# Patient Record
Sex: FEMALE | Race: WHITE | NOT HISPANIC OR LATINO | URBAN - METROPOLITAN AREA
[De-identification: names, ages, dates, MRNs, and addresses within clinical notes are randomized per-mention and may not be internally consistent; named-entity substitution may affect disease eponyms.]

---

## 2023-04-26 ENCOUNTER — OFFICE VISIT (OUTPATIENT)
Dept: PRIMARY CARE | Facility: CLINIC | Age: 48
End: 2023-04-26
Payer: COMMERCIAL

## 2023-04-26 VITALS
BODY MASS INDEX: 30.39 KG/M2 | SYSTOLIC BLOOD PRESSURE: 116 MMHG | TEMPERATURE: 98.1 F | WEIGHT: 178 LBS | HEIGHT: 64 IN | DIASTOLIC BLOOD PRESSURE: 72 MMHG

## 2023-04-26 DIAGNOSIS — D64.9 FATIGUE ASSOCIATED WITH ANEMIA: ICD-10-CM

## 2023-04-26 DIAGNOSIS — E78.01 FAMILIAL HYPERCHOLESTEROLEMIA: Primary | ICD-10-CM

## 2023-04-26 DIAGNOSIS — E55.9 VITAMIN D DEFICIENCY: ICD-10-CM

## 2023-04-26 DIAGNOSIS — Z13.1 SCREENING FOR DIABETES MELLITUS: ICD-10-CM

## 2023-04-26 DIAGNOSIS — Z13.29 SCREENING FOR THYROID DISORDER: ICD-10-CM

## 2023-04-26 PROBLEM — N94.6 DYSMENORRHEA: Status: ACTIVE | Noted: 2023-04-26

## 2023-04-26 PROBLEM — K58.1 IRRITABLE BOWEL SYNDROME WITH CONSTIPATION: Status: ACTIVE | Noted: 2023-04-26

## 2023-04-26 PROBLEM — R07.9 CHEST PAIN: Status: ACTIVE | Noted: 2023-04-26

## 2023-04-26 PROBLEM — N95.1 PERIMENOPAUSAL: Status: ACTIVE | Noted: 2023-04-26

## 2023-04-26 PROBLEM — D51.0 PERNICIOUS ANEMIA: Status: ACTIVE | Noted: 2023-04-26

## 2023-04-26 PROBLEM — U07.1 COVID-19: Status: ACTIVE | Noted: 2023-04-26

## 2023-04-26 PROBLEM — E66.9 OBESITY (BMI 30-39.9): Status: ACTIVE | Noted: 2023-04-26

## 2023-04-26 PROBLEM — R42 VERTIGO: Status: ACTIVE | Noted: 2023-04-26

## 2023-04-26 PROBLEM — N80.9 ENDOMETRIOSIS: Status: ACTIVE | Noted: 2023-04-26

## 2023-04-26 PROBLEM — N92.0 MENORRHAGIA: Status: ACTIVE | Noted: 2023-04-26

## 2023-04-26 PROBLEM — F41.9 ANXIETY: Status: ACTIVE | Noted: 2023-04-26

## 2023-04-26 PROBLEM — N94.10 UNSPECIFIED DYSPAREUNIA: Status: ACTIVE | Noted: 2023-04-26

## 2023-04-26 PROBLEM — K25.9 GASTRIC ULCER: Status: ACTIVE | Noted: 2023-04-26

## 2023-04-26 PROBLEM — K21.9 GERD (GASTROESOPHAGEAL REFLUX DISEASE): Status: ACTIVE | Noted: 2023-04-26

## 2023-04-26 PROBLEM — E78.5 HYPERLIPIDEMIA: Status: ACTIVE | Noted: 2023-04-26

## 2023-04-26 PROBLEM — K59.09 CHRONIC CONSTIPATION: Status: ACTIVE | Noted: 2023-04-26

## 2023-04-26 PROCEDURE — 1036F TOBACCO NON-USER: CPT | Performed by: NURSE PRACTITIONER

## 2023-04-26 PROCEDURE — 99214 OFFICE O/P EST MOD 30 MIN: CPT | Performed by: NURSE PRACTITIONER

## 2023-04-26 RX ORDER — ESCITALOPRAM OXALATE 5 MG/5ML
5 SOLUTION ORAL DAILY
COMMUNITY
Start: 2022-11-16

## 2023-04-26 RX ORDER — FLUTICASONE PROPIONATE 50 MCG
2 SPRAY, SUSPENSION (ML) NASAL
COMMUNITY
Start: 2017-07-19

## 2023-04-26 RX ORDER — PROGESTERONE 200 MG/1
200 CAPSULE ORAL DAILY
COMMUNITY
Start: 2023-04-03 | End: 2023-12-12 | Stop reason: SINTOL

## 2023-04-26 RX ORDER — LORATADINE 10 MG/1
10 TABLET ORAL
COMMUNITY
Start: 2017-07-19

## 2023-04-26 RX ORDER — ESTRADIOL 0.5 MG/.5G
GEL TOPICAL DAILY
COMMUNITY
Start: 2023-04-11 | End: 2023-12-12 | Stop reason: ALTCHOICE

## 2023-04-26 ASSESSMENT — PATIENT HEALTH QUESTIONNAIRE - PHQ9
2. FEELING DOWN, DEPRESSED OR HOPELESS: NOT AT ALL
SUM OF ALL RESPONSES TO PHQ9 QUESTIONS 1 AND 2: 0
1. LITTLE INTEREST OR PLEASURE IN DOING THINGS: NOT AT ALL

## 2023-04-26 NOTE — PATIENT INSTRUCTIONS
Good to see you today.  For scheduling the CT Cardiac Score:  Elijah [phone]  For the labs Fast 8 hours/hydrate/ no supplements

## 2023-04-26 NOTE — PROGRESS NOTES
"Subjective   Patient ID: Miriam Torres is a 48 y.o. female who presents for sick visit (Has not been feeling well, would like to get some blood work done, CT cardiac score test).    HPI Had a virus just after hormone rx started.  Mitesh Guerrero started her on combo estrogen replacement.  Stomach flu  x 10 days.    Still having periods Thinks she has endometriosis, and possibly adenometriosis.   Hysterectomy on June 1 ovaries will remain  Michelle Grieble, GYN      Ears have been bothering  Dizziness is common with vertigo - had been worse for the 10 days    Sunday had a migraine - first for a long time.   Did have migraines in the past.  January did 3 iron infusions    Got less tired.  Not immediate    Lethargy and mid day fatigue.  Middle of march -- 2 weeks in March  Has had some irregular bleeding.      Review of Systems   All other systems reviewed and are negative.      Objective   /72   Temp 36.7 °C (98.1 °F)   Ht 1.626 m (5' 4\")   Wt 80.7 kg (178 lb)   BMI 30.55 kg/m²     Physical Exam  Vitals and nursing note reviewed.   Constitutional:       Appearance: Normal appearance.   HENT:      Head: Normocephalic and atraumatic.      Right Ear: Tympanic membrane, ear canal and external ear normal.      Left Ear: Tympanic membrane, ear canal and external ear normal.      Nose: Nose normal.      Mouth/Throat:      Mouth: Mucous membranes are moist.      Pharynx: Oropharynx is clear.   Eyes:      Conjunctiva/sclera: Conjunctivae normal.      Pupils: Pupils are equal, round, and reactive to light.   Neck:      Thyroid: No thyroid mass, thyromegaly or thyroid tenderness.   Cardiovascular:      Rate and Rhythm: Normal rate and regular rhythm.      Pulses: Normal pulses.      Heart sounds: Normal heart sounds.   Pulmonary:      Effort: Pulmonary effort is normal.      Breath sounds: Normal breath sounds.   Abdominal:      General: Bowel sounds are normal.      Palpations: Abdomen is soft.   Musculoskeletal:      " Cervical back: Normal range of motion and neck supple.   Lymphadenopathy:      Cervical: No cervical adenopathy.   Skin:     General: Skin is warm.   Neurological:      Mental Status: She is alert and oriented to person, place, and time. Mental status is at baseline.   Psychiatric:         Mood and Affect: Mood normal.         Behavior: Behavior normal.         Assessment/Plan   Problem List Items Addressed This Visit          Endocrine/Metabolic    Vitamin D deficiency    Relevant Orders    Vitamin D, Total       Hematologic    Fatigue associated with anemia    Relevant Orders    Vitamin B12       Other    Hyperlipidemia - Primary    Relevant Orders    Lipid Panel    CT cardiac scoring wo IV contrast    Screening for thyroid disorder    Relevant Orders    TSH with reflex to Free T4 if abnormal   Check labs and CT Cardiac Score

## 2023-04-30 PROBLEM — Z13.1 SCREENING FOR DIABETES MELLITUS: Status: ACTIVE | Noted: 2023-04-30

## 2023-04-30 PROBLEM — Z13.29 SCREENING FOR THYROID DISORDER: Status: ACTIVE | Noted: 2023-04-30

## 2023-06-01 ENCOUNTER — HOSPITAL ENCOUNTER (OUTPATIENT)
Dept: DATA CONVERSION | Facility: HOSPITAL | Age: 48
End: 2023-06-02
Attending: STUDENT IN AN ORGANIZED HEALTH CARE EDUCATION/TRAINING PROGRAM | Admitting: STUDENT IN AN ORGANIZED HEALTH CARE EDUCATION/TRAINING PROGRAM
Payer: COMMERCIAL

## 2023-06-01 DIAGNOSIS — N80.329 ENDOMETRIOSIS OF THE POSTERIOR CUL-DE-SAC, UNSPECIFIED DEPTH: ICD-10-CM

## 2023-06-01 DIAGNOSIS — E78.5 HYPERLIPIDEMIA, UNSPECIFIED: ICD-10-CM

## 2023-06-01 DIAGNOSIS — N80.351 ENDOMETRIOSIS OF THE RIGHT PELVIC SIDEWALL, UNSPECIFIED DEPTH: ICD-10-CM

## 2023-06-01 DIAGNOSIS — N83.8 OTHER NONINFLAMMATORY DISORDERS OF OVARY, FALLOPIAN TUBE AND BROAD LIGAMENT: ICD-10-CM

## 2023-06-01 DIAGNOSIS — R10.2 PELVIC AND PERINEAL PAIN: ICD-10-CM

## 2023-06-01 DIAGNOSIS — N80.123 DEEP ENDOMETRIOSIS OF BILATERAL OVARIES: ICD-10-CM

## 2023-06-01 DIAGNOSIS — D51.0 VITAMIN B12 DEFICIENCY ANEMIA DUE TO INTRINSIC FACTOR DEFICIENCY: ICD-10-CM

## 2023-06-01 DIAGNOSIS — E66.9 OBESITY, UNSPECIFIED: ICD-10-CM

## 2023-06-01 DIAGNOSIS — D25.9 LEIOMYOMA OF UTERUS, UNSPECIFIED: ICD-10-CM

## 2023-06-01 DIAGNOSIS — N94.89 OTHER SPECIFIED CONDITIONS ASSOCIATED WITH FEMALE GENITAL ORGANS AND MENSTRUAL CYCLE: ICD-10-CM

## 2023-06-01 DIAGNOSIS — N80.383: ICD-10-CM

## 2023-06-01 LAB
ANION GAP IN SER/PLAS: 14 MMOL/L (ref 10–20)
CALCIUM (MG/DL) IN SER/PLAS: 9 MG/DL (ref 8.6–10.6)
CARBON DIOXIDE, TOTAL (MMOL/L) IN SER/PLAS: 23 MMOL/L (ref 21–32)
CHLORIDE (MMOL/L) IN SER/PLAS: 104 MMOL/L (ref 98–107)
CREATININE (MG/DL) IN SER/PLAS: 0.86 MG/DL (ref 0.5–1.05)
ERYTHROCYTE DISTRIBUTION WIDTH (RATIO) BY AUTOMATED COUNT: NORMAL
ERYTHROCYTE MEAN CORPUSCULAR HEMOGLOBIN CONCENTRATION (G/DL) BY AUTOMATED: NORMAL
ERYTHROCYTE MEAN CORPUSCULAR VOLUME (FL) BY AUTOMATED COUNT: NORMAL
ERYTHROCYTES (10*6/UL) IN BLOOD BY AUTOMATED COUNT: NORMAL
GFR FEMALE: 84 ML/MIN/1.73M2
GLUCOSE (MG/DL) IN SER/PLAS: 135 MG/DL (ref 74–99)
HEMATOCRIT (%) IN BLOOD BY AUTOMATED COUNT: NORMAL
HEMOGLOBIN (G/DL) IN BLOOD: NORMAL
LEUKOCYTES (10*3/UL) IN BLOOD BY AUTOMATED COUNT: NORMAL
MAGNESIUM (MG/DL) IN SER/PLAS: 2.31 MG/DL (ref 1.6–2.4)
NRBC (PER 100 WBCS) BY AUTOMATED COUNT: NORMAL
PLATELETS (10*3/UL) IN BLOOD AUTOMATED COUNT: NORMAL
POTASSIUM (MMOL/L) IN SER/PLAS: 4.9 MMOL/L (ref 3.5–5.3)
SODIUM (MMOL/L) IN SER/PLAS: 136 MMOL/L (ref 136–145)
UREA NITROGEN (MG/DL) IN SER/PLAS: 15 MG/DL (ref 6–23)

## 2023-06-02 LAB
ERYTHROCYTE DISTRIBUTION WIDTH (RATIO) BY AUTOMATED COUNT: 15.4 % (ref 11.5–14.5)
ERYTHROCYTE MEAN CORPUSCULAR HEMOGLOBIN CONCENTRATION (G/DL) BY AUTOMATED: 32.9 G/DL (ref 32–36)
ERYTHROCYTE MEAN CORPUSCULAR VOLUME (FL) BY AUTOMATED COUNT: 85 FL (ref 80–100)
ERYTHROCYTES (10*6/UL) IN BLOOD BY AUTOMATED COUNT: 4.09 X10E12/L (ref 4–5.2)
HEMATOCRIT (%) IN BLOOD BY AUTOMATED COUNT: 34.7 % (ref 36–46)
HEMOGLOBIN (G/DL) IN BLOOD: 11.4 G/DL (ref 12–16)
LEUKOCYTES (10*3/UL) IN BLOOD BY AUTOMATED COUNT: 11.2 X10E9/L (ref 4.4–11.3)
NRBC (PER 100 WBCS) BY AUTOMATED COUNT: 0 /100 WBC (ref 0–0)
PLATELETS (10*3/UL) IN BLOOD AUTOMATED COUNT: 176 X10E9/L (ref 150–450)

## 2023-06-09 LAB — URINE CULTURE: ABNORMAL

## 2023-06-13 LAB
COMPLETE PATHOLOGY REPORT: NORMAL
CONVERTED CLINICAL DIAGNOSIS-HISTORY: NORMAL
CONVERTED FINAL DIAGNOSIS: NORMAL
CONVERTED FINAL REPORT PDF LINK TO COPY AND PASTE: NORMAL
CONVERTED GROSS DESCRIPTION: NORMAL
CONVERTED INTRAOPERATIVE DIAGNOSIS: NORMAL

## 2023-06-16 LAB — URINE CULTURE: ABNORMAL

## 2023-06-18 LAB — URINE CULTURE: NORMAL

## 2023-07-23 LAB
URINE CULTURE: ABNORMAL
URINE CULTURE: ABNORMAL

## 2023-08-17 LAB
APPEARANCE, URINE: ABNORMAL
BILIRUBIN, URINE: NEGATIVE
BLOOD, URINE: ABNORMAL
COLOR, URINE: YELLOW
GLUCOSE, URINE: NEGATIVE MG/DL
KETONES, URINE: NEGATIVE MG/DL
LEUKOCYTE ESTERASE, URINE: ABNORMAL
MUCUS, URINE: NORMAL /LPF
NITRITE, URINE: NEGATIVE
PH, URINE: 5 (ref 5–8)
PROTEIN, URINE: NEGATIVE MG/DL
RBC, URINE: 3 /HPF (ref 0–5)
SPECIFIC GRAVITY, URINE: 1.03 (ref 1–1.03)
SQUAMOUS EPITHELIAL CELLS, URINE: 9 /HPF
URINE CULTURE: ABNORMAL
UROBILINOGEN, URINE: <2 MG/DL (ref 0–1.9)
WBC, URINE: 5 /HPF (ref 0–5)

## 2023-08-19 LAB — URINE CULTURE: ABNORMAL

## 2023-09-07 VITALS
RESPIRATION RATE: 16 BRPM | HEIGHT: 63 IN | HEART RATE: 72 BPM | DIASTOLIC BLOOD PRESSURE: 83 MMHG | SYSTOLIC BLOOD PRESSURE: 134 MMHG | BODY MASS INDEX: 30.78 KG/M2 | WEIGHT: 173.72 LBS | TEMPERATURE: 97.5 F

## 2023-09-30 NOTE — DISCHARGE SUMMARY
Send Summary:     Note Recipients:        Discharge:    Summary:   Admission Date: .01-Jun-2023 09:45:00   Discharge Date: 02-Jun-2023   Attending Physician at Discharge: Sejal Reyes   Admission Reason: Robotic excision of endometriosis  and hysterectomy   Final Discharge Diagnoses: S/p Robotic excision of  endometriosis and hysterectomy   Procedures: Date: 01-Jun-2023 16:12:00  Procedure Name: 1. Robotic-assisted total hysterectomy  2. Bilateral salpingectomy  3. Bilateral ovarian cystectomy  4. Excision of endometriosis  5. Cystoscopy   Condition at Discharge: Satisfactory   Disposition at Discharge: .Home   Vital Signs:        T   P  R  BP   MAP  SpO2   Value  36.7  62  17  118/73   88  99%  Date/Time 6/2 3:32 6/2 3:32 6/2 3:32 6/2 3:32  6/2 3:32 6/2 3:32  Range  (36.7C - 36.7C )  (62 - 74 )  (16 - 20 )  (118 - 145 )/ (73 - 83 )  (88 - 104 )  (95% - 99% )    Date:            Weight/Scale Type:  Height:   01-Jun-2023 10:12  78.8  kg         159.8  cm  Physical Exam:    Constitutional: No visible distress, alert and cooperative  Eyes: clear sclera  Head/Neck: Normocephalic  Respiratory/Thorax: Normal respiratory effort on RA  Gastrointestinal: soft, nondistended, nontender, without rebound or guarding. Surgical incisions present with steristrips in place, c/d/i.  Musculoskeletal: grossly normal ROM  Neurological: A&Ox3  Psychological: Appropriate mood and behavior  Skin: warm, dry, no lesions.  Hospital Course:    Patient underwent uncomplicated robotic excision of endometriosis and hysterectomy for endometriosis. See operative note for full details. Her post-operative course  was uncomplicated. By POD#1, she was meeting all milestones; pain was well-controlled, and she was voiding, ambulating, and tolerating a regular diet. She was discharged to home with plans to follow-up in 2 weeks with Dr. Leonard.      Discharge Information:    and Continuing Care:   Lab Results - Pending:    Surgical Pathology  Drawn at 01-Jun-2023 15:14:00  Surgical Pathology Drawn at 01-Jun-2023 14:54:00  Surgical Pathology Drawn at 01-Jun-2023 12:35:00  Radiology Results - Pending: None   Discharge Instructions:    Activity:           activity as tolerated.          May shower..            May not drive while taking narcotics.            No pushing, pulling, or lifting objects greater than 10 pounds for 6 week(s).            Weight-bearing Instructions: full weight bearing.            You may do stairs as tolerated.  Use the handrail and have someone with you until you regain your strength.    Pelvic rest for 6 weeks.  This means nothing to enter the vagina:  No sex, tampons, douching, tub baths, hot tubs, or swimming.    Nutrition/Diet:           resume normal diet    Wound Care:           Wound Site:   Abdomen          Wound Type:   surgical incision          Cleanse With:   soap and water          Instructions:   no lotions, creams, or tub soaks          Other Instructions:   Your incision is closed with stitches. The stitches dissolve and do not need to be removed.    You have steri strips (small paper tape) along your incision. You can shower, pat dry; do not soak in a tub.  The steri strips will fall off on their own.  If they do not fall off in a week, gently peel them off.    Additional Orders:           Additional Instructions:   Take your opioid prescription (Oxycodone) in conjunction with the anti-inflammatory (Ibuprofen) as they work differently.  As your pain improves wean off of the Opioid first.  Opioids can cause constipation;  you may  take an over-the-counter laxative (e.g. Miralax) in addition to your stool softener prescription (Colace) if needed.  Do not exceed 3,000 milligrams daily of Acetaminophen (Tylenol) from any source (e.g. cold meds).  You may take over-the-counter Simethicone  (e.g. Gas-X, Mylicon) if needed for gas pain.    If you are having poor pain control and need a refill of an opioid prescription  (e.g. Oxycodone) prior to a follow-up visit, you will need to make arrangements to   a prescription at a clinic site.  Please note: during weekends and holidays, there  is no way to obtain a written prescription until the next business day, so please plan accordingly.    Sennakot-S is a laxative that you can take once daily to prevent hard stool or constipation. You will be instructed to use Sennakot-S while on narcotics, as they may cause constipation. You should not take this medication for loose watery stool. This  is an over the counter medication.     Follow Up Appointments:    Follow-Up Appointment 01:           Physician/Dept/Service:   Dr. Leonard          Reason for Referral:   Post-op visit          Call to Schedule in:   2 weeks          Scheduled Date/Time:   15-Yefri-2023 01:00          Location:   [facility]          Phone Number:   [phone]    Discharge Medications: Home Medication   docusate-senna 50 mg-8.6 mg oral capsule - 2 cap(s) orally 2 times a day   oxyCODONE 5 mg oral tablet - 1 tab(s) orally every 6 hours as needed  B12 - 1 tab(s) oral once a day  Claritin 10 mg oral tablet - 1 tab(s) oral once a day  Cymbalta 20 mg oral delayed release capsule - 1 cap(s) oral once a day  Estradiol Vaginal - 1 caleb vaginal once a day  magnesium - 1 tab(s) oral once a day  turmeric 500 mg oral capsule - 1 tab(s) oral once a day  Xanax 0.5 mg oral tablet - 1 tab(s) oral prn  acetaminophen 500 mg oral capsule - 2 cap(s) orally every 6 hours   ibuprofen 600 mg oral tablet - 1 tab(s) orally every 6 hours  every 6 hours as needed for pain      PRN Medication   ondansetron 4 mg oral tablet, disintegrating - 1 tab(s) orally every 6 hours, As Needed for nausea     DNR Status:   ·  Code Status Code Status order at time of discharge: Full Code     Attestation:   Note Completion:  I am a:  Resident/Fellow   Attending Attestation I reviewed the resident/fellow?s documentation and discussed the patient with the  resident/fellow.  I agree with the resident/fellow?s medical  decision making as documented in the note.          Electronic Signatures:  Michelle Leonard (MD)  (Signed 02-Jun-2023 12:19)   Authored: Summary Content, Note Completion   Co-Signer: Send Summary, Summary Content, Ongoing Care, DNR Status, Note Completion  Angy Sherman (Resident))  (Signed 02-Jun-2023 06:46)   Authored: Send Summary, Summary Content, Ongoing Care,  DNR Status, Note Completion      Last Updated: 02-Jun-2023 12:19 by Michelle Leonard)

## 2023-09-30 NOTE — PROGRESS NOTES
Service: Gynecology/Obstetrics     Subjective Data:   TERRY DEJESUS is a 48 year old Female who is Hospital Day # 1 and POD #0 for 1. Robotic-assisted total hysterectomy;2. Bilateral salpingectomy;3. Bilateral ovarian cystectomy;4. Excision of endometriosis;5.  Cystoscopy.    Additional Information:    Pain well-controlled. Tolerating PO w/o n/v. Had vertigo with difficulty ambulating to restroom. Was able to void a small amount    Objective Data:     Objective Information:          Pain reported at 6/1 18:30: 4 = Moderate    Physical Exam by System:    Constitutional: Awake, alert, oriented, in no acute  distress   Eyes: Clear sclera   ENMT: MMM   Head/Neck: NC/AT   Respiratory/Thorax: Breathing comfortably on 2L NC   Cardiovascular: Warm and well-perfused   Gastrointestinal: Soft, moderately distended and  tympanic, appropriately tender to palpation without guarding, rebound, or rigidity, incisions dressed w/o shadowing   Musculoskeletal: Moving all four extremities   Neurological: Alert and oriented   Psychological: Appropriate affect   Skin: Warm, dry, no lesions noted     Medication:    Medications:      CARDIOVASCULAR AGENTS:    1. Labetalol Injectable:  5  mg  IntraVenous Push  Once   PRN       2. hydrALAZINE (APRESOLINE) Injectable:  5  mg  IntraVenous Push  Every 30 Minutes   PRN         CENTRAL NERVOUS SYSTEM AGENTS:    1. Acetaminophen:  650  mg  Oral  Every 4 Hours   PRN       2. HYDROmorphone Injectable:  0.2  mg  IntraVenous Push  Every 5 Minutes   PRN       3. HYDROmorphone Injectable:  0.4  mg  IntraVenous Push  Every 5 Minutes   PRN       4. oxyCODONE Immediate Release:  5  mg  Oral  Every 4 Hours   PRN       5. Ondansetron Injectable:  4  mg  IntraVenous Push  Once   PRN       6. Promethazine IV Piggy Back:  6.25  mg  IntraVenous Piggyback  Once   PRN       7. Droperidol Injectable:  0.625  mg  IntraVenous Push  Once   PRN         MISCELLANEOUS AGENTS:    1. Naloxone Injectable:  0.2  mg   IntraVenous Push  Once   PRN         NUTRITIONAL PRODUCTS:    1. Lactated Ringers Infusion:  1000  mL  IntraVenous  <Continuous>      PSYCHOTHERAPEUTIC AGENTS:    1. DULoxetine:  20  mg  Oral  Daily      RESPIRATORY AGENTS:    1. diphenhydrAMINE Injectable:  12.5  mg  IntraVenous Push  Once   PRN       2. Loratadine:  10  mg  Oral  Daily    3. Albuterol 2.5 mg/ 3 mL Nebulizer Soln:  3  mL  Inhalation  Once   PRN         Assessment and Plan:   Code Status:  ·  Code Status Full Code     Assessment:    49 y/o who is POD#0 (6/1) from robotic-assisted total hysterectomy, BS, bilateral ovarian cystectomy, excision of endometriosis, and cystoscopy    Neuro   -Scheduled tylenol and toradol for pain with oxy PRN for breakthrough  -Transition to PO pain meds POD#1    -Home cymbalta continued on admission     CV/Heme  -Hemodynamically stable  -Pre-op hgb 11.9 --> EBL 100cc --> f/u POD#0 hgb   -Continue to monitor for si/sx of anemia    Pulm  -Satting well on room air while awake, on 2L NC while asleep  -Wean O2 as tolerated  -Encouraged incentive spirometry 10x/hr while awake    FEN/GI  -Regular diet with anti-emetics and bowel regimen ordered  -BMP and Mg level pending  -IVF at 40cc/hr      -Strict I/O's with goal UOP > 0.5cc/kg/hr  -Rogel removed in OR, voided in PACU    ID  -Afebrile    DVT PPx  -SCDs, encourage early ambulation  -VTE risk score = 4, no indication for pharmacologic PPx    Dispo: continue inpatient management until meeting postoperative milestones     Ella Jewell MD  PGY-4, OB/GYN   Gynecology, pager: [phone]       Electronic Signatures:  Ella Jewell (MD (Resident))  (Signed 01-Jun-2023 20:02)   Authored: Service, Subjective Data, Objective Data, Assessment  and Plan, Note Completion      Last Updated: 01-Jun-2023 20:02 by Ella Jewell (MD (Resident))

## 2023-09-30 NOTE — H&P
"    History of Present Illness:   Pregnant/Lactating:  ·  Are You Pregnant no   ·  Are You Currently Breastfeeding no     History Present Illness:  Reason for surgery: 47 yo w/ clinical diagnosis of  endometriosis 5 years ago and long history of HMB p/f robotic excision of endometriosis, ovarian cystectomy, bilateral salpingectomy, possible supracervical vs total hysterectomy, possible appendectomy   HPI:    47 yo w/ clinical diagnosis of endometriosis 5 years ago and long history of HMB p/f robotic excision of endometriosis, ovarian cystectomy, bilateral salpingectomy, possible supracervical  vs total hysterectomy, possible appendectomy    Preop labs (5/23): Hb 11.9, Plt 232, Cr 0.88    Imaging:  MRI 4/2023:   BIlat hemorrhagic nonenhancing cystic adnexal lesions, may be same as bilat adnexal abnormalities seen on previous US. Are posterior to uterine body with medial margins of R and L adnexal cysts nearly \"kissing\" posterior to uterus. The junctional zone  is diffusely thickened up to at least 9mm and in some areas, potentially as thick as 14-16mm. Other MR findings associated with adenomyosis are all absent. Two small uterine fibroids, both myometrial. Uterus [id number].    Recent pelvis US (at OSH, pt has report on phone) showed bilateral endometriomas, could not further delineate extent of endometriosis.     GYN hx: Last pap 11/2022 negative/negative. Hx of abnormal pap: LEEP in 1990's, normal paps since. EMB in 2020 WNL   PMHx: IBS, vertigo  PSHx: Scalp cyst removal  Meds: Lexapro, Claritin, Xanax PRN, estradiol gel  All: NKDA  Social hx: neg x3  Fam hx: reviewed, noncontributory       Allergies:        Allergies:  ·  No Known Allergies :     Home Medication Review:   Home Medications Reviewed: yes     Impression/Procedure:   ·  Impression and Planned Procedure: 47 yo w/ clinical diagnosis of endometriosis 5 years ago and long history of HMB p/f robotic excision of endometriosis, ovarian cystectomy, bilateral " salpingectomy,  possible supracervical vs total hysterectomy, possible appendectomy       ERAS (Enhanced Recovery After Surgery):  ·  ERAS Patient: yes   ·  CPM/PAT Utilization: no   ·  Immunonutrition Recovery Drink Utilization: no   ·  Carbohydrate Supplement Drink Utilization: no     Review of Systems:   Review of Systems:  All Other Systems: All other systems reviewed and  are negative       Physical Exam by System:    Constitutional: NAD   Eyes: EOMI   ENMT: mucous membranes moist   Head/Neck: NCAT   Respiratory/Thorax: normal work of breathing   Cardiovascular: warm and well perfused   Gastrointestinal: abdomen soft   Musculoskeletal: normal ROM   Extremities: no edema appreciated   Neurological: no gross deficits   Psychological: normal mood and affect   Skin: Warm and dry     Consent:   COVID-19 Consent:  ·  COVID-19 Risk Consent Surgeon has reviewed key risks related to the risk of vladimir COVID-19 and if they contract COVID-19 what the risks are.     Attestation:   Note Completion:  I am a:  Resident/Fellow   Attending Attestation I saw and evaluated the patient.  I personally obtained the key and critical portions of the history and physical exam or was physically present for key and  critical portions performed by the resident/fellow. I reviewed the resident/fellow?s documentation and discussed the patient with the resident/fellow.  I agree with the resident/fellow?s medical decision making as documented in the note.     I personally evaluated the patient on 01-Jun-2023         Electronic Signatures:  Michelle Leonard)  (Signed 01-Jun-2023 10:43)   Authored: Note Completion   Co-Signer: History of Present Illness, Allergies, Home Medication Review, Impression/Procedure, ERAS, Review of Systems, Physical Exam,  Consent, Note Completion  Angy Sherman (Resident))  (Signed 31-May-2023 14:53)   Authored: History of Present Illness, Allergies, Home  Medication Review, Impression/Procedure, ERAS,  Review of Systems, Physical Exam, Consent, Note Completion      Last Updated: 01-Jun-2023 10:43 by Michelle Leonard)

## 2023-10-02 NOTE — OP NOTE
Post Operative Note:     PreOp Diagnosis: Pelvic pain, ovarian cysts   Post-Procedure Diagnosis: Same   Procedure: 1. Robotic-assisted total hysterectomy  2. Bilateral salpingectomy  3. Bilateral ovarian cystectomy  4. Excision of endometriosis  5. Cystoscopy  6. Lysis of adhesions  7. Bilateral ureterolysis   Surgeon: STIVEN Leonard   Resident/Fellow/Other Assistant: RAYMOND House   Anesthesia: GETA   I.V. Fluids: 2000cc crystalloid   Estimated Blood Loss (mL): 100   Specimen: yes. Peritoneal biopsies, bilateral ovarian  cyst walls, uterus, cervix, bilateral fallopian tubes   Complications: None   Findings: See operative dictation   Patient Returned To/Condition: PACU/stable   Urine Output: 200cc   Drains and/or Catheters: Rogel catheter removed at  end of case   Additional Details: Prior to OR, it was confirmed  in preop with patient that she did desire total hysterectomy as opposed to supracervical.     Operative Report Dictated:  Dictation: not applicable - note contains Operative  Report   Operative Report:    Findings  Right Hemidiaphragm: normal, no evidence of endometriosis  Left Hemidiaphragm: normal, no evidence of endometriosis  Liver Edge: normal  Stomach Edge: normal     Large Intestines: normal, no evidence of endometriosis         Small Intestines: normal, no evidence of endometriosis  Appendix: normal appearing, no evidence of endometriosis    Right Ovary: adherent to uterus/pelvic sidewall, multiple small endometriotic implants, single 2cm endometrioma  Right Fallopian Tube: dilated, adherent to ovary   Left Ovary: adherent to uterus/pelvic sidewall, multiple small endometriotic implants, single 5cm endometrioma, hemorrhagic cyst filled with red spongy tissue (sent for frozen, hemorrhagic tissue  w/o e/o carcinoma)   Left Fallopian Tube: dilated, adherent to ovary   Uterus: 7 week size, adherent to bilateral ovaries and rectosigmoid colon, roughly 3cm subserosal fibroid at fundus      Right  Pelvic Sidewall: adherent to ovary, thickened tissue throughout, gunpowder lesions near uterosacral   Right Uterosacral Ligament: gunpowder lesions and thickening of tissue   Left Pelvic Sidewall: adherent to ovary, thickened tissue throughout, gunpowder lesions near uterosacral  Left Uterosacral Ligament: gunpowder lesions and thickening of tissue   Posterior Cul De Sac: obliterated w/ 3cm x 4cm nodule noted tethering posterior uterus and rectosigmoid colon together   Bladder Peritoneum: small scattered pockets      Description of Procedure  Patient was taken to the operating room where she was prepared and draped in the usual sterile fashion.  A [employer]  uterine manipulator was placed.  A Rogel catheter was placed. Attention was then turned abdominally.  The base of the umbilicus was elevated using Kocher clamps.  The skin was incised with a scalpel.  The fascia was grasped with Kochers and entered  sharply using a scalpel.  Peritoneum was bluntly opened using a Miriam clamp.  Intraperitoneal placement was confirmed via visualization of underlying bowel.     Carlee trocar was then placed at the umbilical entry site. Diagnostic laparoscopy was performed, and revealed findings as noted above.  No areas of trauma or injury were noted immediately inferior to the umbilicus.  The patient was placed in steep Trendelenburg  positioning.  Pelvic findings were as noted above.       Ancillary trocars were then placed under direct visualization. Three robotic trocars and one  8 mm assistant port were used. The small intestine was run from the ileocecal junction to the level of the duodenum.  No abnormalities were noted.  The robot was then docked.    Attention was turned to the obliterated cul-de-sac. The ovaries were  from the sidewalls and uterus with a combination of cold scissors, gentle blunt dissection, and careful cautery. Bilateral cystectomies were then performed with chocolate fluid  noted on cyst rupture.  The scattered endometriotic lesions were then ablated.      The ovaries were pexied to the anterior abdominal wall using an 0 V-loc.  The posterior cul-de-sac was then inspected.  The pelvic  sidewall peritoneum on the left was elevated at the pelvic brim away from underlying structures away from the ureter and iliac vasculature.  It was incised using monopolar electrosurgery.  The incision was carried parallel to the infundibulopelvic ligament,  inferior to the ovarian stroma, and parallel to the uterus.  The peritoneum was then elevated away from the pelvic sidewall.  The ureter was identified and dissected.  The pelvic sidewall peritoneum was fully  from the retroperitoneal structures  using a combination of monopolar electrosurgery and blunt dissection.  Full ureterolysis was completed to ensure that all peritoneum was being excised while a safe margin was being maintained from the ureter.  No areas of peritoneum were noted to remain  on the left pelvic sidewall.     Attention was then turned to the right pelvic sidewall.  The peritoneum was excised in a manner identical to that completed on the left.  The posterior cul-de-sac and pelvic sidewalls were then thoroughly suction irrigated.  Excellent hemostasis was  noted and no areas of trauma were noted.     Attention was then turned to the posterior cul-de-sac. Dissection was carried from lateral to medial with care taken to avoid injury to the bowel. The bowel wall was identified and the nodule tethering  bowel and uterus together excised. The posterior cul-de-sac peritoneum was fully excised using a combination of monopolar electrosurgery and blunt dissection.  Care was taken to avoid injury  to the underlying rectum and bowel.  The pexied ovaries were released.  Excellent hemostasis was noted.       Attention was then turned anteriorly.  The bladder peritoneum was incised using monopolar electrosurgery.  A peritoneal incision was carried from the  left round ligament to the pubic symphysis and ended at the right round ligament.  The peritoneum  was then stripped away from the underlying bladder using a combination of monopolar electrosurgery and blunt dissection.  Full excision of bladder peritoneum was noted at the conclusion of this dissection.    The right Fallopian tube was then transected to the level of the cornua with the vessel sealer device. Bladder flap was started and uterine arteries skeletonized. The uterine arteries were the clamped, sealed, transected and lateralized with the vessel  sealer. This process was then repeated on the left side of the uterus. The bladder flap was then completed and colpotomy made with the use of monopolar cautery. The uterus/cervix/Fallopian tubes and manipulator were then removed through the vagina.     The vaginal cuff was then closed with a running suture of 0 V-loc.     The pelvis was then thoroughly suction irrigated. Floseal was placed in the posterior cul-de-sac and good hemostasis noted.  All instruments  were then removed from the abdomen and pelvis.  The robot was undocked.     The umbilical fascia was then closed with 0 Vicryl suture.  The skin was closed with 4-0 Monocryl. The Rogel catheter was removed. Cystoscopy was performed and showed an atraumatic bladder w/ bilateral  ureteral efflux. The patient was awakened from general anesthesia and taken the recovery room in stable condition.      I was present and scrubbed for the entirety of the surgical procedure.    This case was substantially more difficult due to extensive endometriotic scarring causing severe distortion of anatomic planes and neovascularization.        Attestation:   Note Completion:  I am a: Resident/Fellow   Attending Attestation I was present for the entire procedure          Electronic Signatures:  Michelle Leonard)  (Signed 02-Jun-2023 14:19)   Authored: Post Operative Note, Note Completion   Co-Signer: Post Operative Note, Note  Completion  Ella Jewell (Resident))  (Signed 01-Jun-2023 16:14)   Authored: Post Operative Note, Note Completion      Last Updated: 02-Jun-2023 14:19 by Michelle Leonard)

## 2023-11-27 ENCOUNTER — TELEPHONE (OUTPATIENT)
Dept: OBSTETRICS AND GYNECOLOGY | Facility: CLINIC | Age: 48
End: 2023-11-27
Payer: COMMERCIAL

## 2023-11-28 ENCOUNTER — TELEPHONE (OUTPATIENT)
Dept: OBSTETRICS AND GYNECOLOGY | Facility: CLINIC | Age: 48
End: 2023-11-28
Payer: COMMERCIAL

## 2023-12-05 ENCOUNTER — TELEPHONE (OUTPATIENT)
Dept: OBSTETRICS AND GYNECOLOGY | Facility: CLINIC | Age: 48
End: 2023-12-05
Payer: COMMERCIAL

## 2023-12-05 NOTE — TELEPHONE ENCOUNTER
Nurse left message for pt to return call:  Mitesh Guerrero can see pt on 12/12/2023 at 11:40 AM    ELÍAS Bautista-COLLEEN Rivera, RN  Caller: Unspecified (1 week ago)  Can we look at my schedule to fit her in for a virtual appointment; in my last note in 9/2023 she was feeling much better on the MHT

## 2023-12-06 ENCOUNTER — TELEPHONE (OUTPATIENT)
Dept: OBSTETRICS AND GYNECOLOGY | Facility: CLINIC | Age: 48
End: 2023-12-06
Payer: COMMERCIAL

## 2023-12-06 NOTE — TELEPHONE ENCOUNTER
Pt returned call  Verified pt by name and   Pt states she is still having symptoms.  Nurse scheduled pt for 2023.  Pt has no questions at this time.

## 2023-12-12 ENCOUNTER — TELEMEDICINE (OUTPATIENT)
Dept: OBSTETRICS AND GYNECOLOGY | Facility: CLINIC | Age: 48
End: 2023-12-12
Payer: COMMERCIAL

## 2023-12-12 DIAGNOSIS — Z79.890 MENOPAUSAL SYNDROME ON HORMONE REPLACEMENT THERAPY: Primary | ICD-10-CM

## 2023-12-12 DIAGNOSIS — N95.1 MENOPAUSAL SYNDROME ON HORMONE REPLACEMENT THERAPY: Primary | ICD-10-CM

## 2023-12-12 PROCEDURE — 99442 PR PHYS/QHP TELEPHONE EVALUATION 11-20 MIN: CPT | Performed by: NURSE PRACTITIONER

## 2023-12-12 RX ORDER — ESTRADIOL 1.25 MG/1.25G
1.25 GEL TOPICAL DAILY
Qty: 1.25 G | Refills: 11 | Status: SHIPPED | OUTPATIENT
Start: 2023-12-12 | End: 2023-12-12 | Stop reason: SDUPTHER

## 2023-12-12 RX ORDER — ESTRADIOL 1.25 MG/1.25G
1.25 GEL TOPICAL DAILY
Qty: 1.25 G | Refills: 11 | Status: SHIPPED | OUTPATIENT
Start: 2023-12-12

## 2023-12-12 NOTE — PROGRESS NOTES
Subjective   Patient ID: Miriam Torres is a 48 y.o. female who presents for No chief complaint on file..  HPI  h/o endometriosis and hysterectomy  on estradiol gel 1mg/gm, sleeping well  c/o weight gain   c/o fatigue, lack of motivation  Dr. Leonard recommended she restart progesterone; previously she had bloating and breast tenderness and worse sleep  working with a different provider for c/o anxiety   she would like to try testosterone for HSDD: req for baseline labs, normal 9/2023    Today:   Previously had breast tenderness that has since resolved  No longer having VMS  Having hair loss and thinning  Anxiety and depression worse; working on weaning off Lexapro, working with a psychiatrist; having AE-hsdd  Tried progesterone vaginally but still had AE and is no longer taking it  Decided to not start testosterone at this time      Review of Systems    Objective   Physical Exam    Assessment/Plan   Diagnoses and all orders for this visit:  Menopausal syndrome on hormone replacement therapy  -     estradioL 1.25 mg/1.25 gram (0.1 %) gel in packet; Place 1.25 mg on the skin once daily.       Decision to increase estradiol dose  Pt to contact me with an update of the increased dose and what happens with the SSRI    ELÍAS Bautista-CNP 12/12/23 11:46 AM

## 2023-12-20 ENCOUNTER — TELEPHONE (OUTPATIENT)
Dept: OBSTETRICS AND GYNECOLOGY | Facility: CLINIC | Age: 48
End: 2023-12-20
Payer: COMMERCIAL

## 2023-12-21 NOTE — TELEPHONE ENCOUNTER
Spoke with [facility]-requesting to dispense 90 day supply of Estradiol gel.  Advised 90 day supply be dispensed with 4 RF's.   Made pt aware.

## 2023-12-21 NOTE — TELEPHONE ENCOUNTER
[facility] hours [facility] (2:10 PM)     RE  [facility] need more info before they can fill estrdiol gel

## 2024-03-12 ENCOUNTER — TELEPHONE (OUTPATIENT)
Dept: OBSTETRICS AND GYNECOLOGY | Facility: CLINIC | Age: 49
End: 2024-03-12
Payer: COMMERCIAL

## 2024-03-12 NOTE — TELEPHONE ENCOUNTER
Called patient to discuss message regarding changing medication dose.  Left vm for patient to return call.    DYLAN Lutz RN      -----message------  Cesar el calling to speak with nurse about changing the dosage of the estrogen rx.

## 2024-03-12 NOTE — TELEPHONE ENCOUNTER
Patient returning phone call.  Identified by name and .  Patient debating need for dose change for estrogen.  Started taking increased dose (1.25mg) about a month ago, since then has noticed significant increase in her acne, is worried about hair loss and weight changes as well and not sure what to do. Wants to know what Aliya's thought are and if she should decrease back down to 1mg or continue to take and see if she adjusts.   Patient did state she is under a lot of stress at the moment that likely will not improve any time soon.  Also states new diagnosis of Lichen Sclerosis, does not know if this is relevant but would like aliya to be aware.  Informed patient I will relay all of this to Aliya and see her thoughts, and get back to patient.  All questions and concerns addressed at this time.    DYLAN Lutz RN

## 2024-03-13 ENCOUNTER — TELEPHONE (OUTPATIENT)
Dept: OBSTETRICS AND GYNECOLOGY | Facility: CLINIC | Age: 49
End: 2024-03-13
Payer: COMMERCIAL

## 2024-03-13 NOTE — TELEPHONE ENCOUNTER
Called patient to discuss medication options with her.  Left vm for patient to return call to discuss further.    SHEN LutzN RN

## 2024-03-13 NOTE — TELEPHONE ENCOUNTER
Patient returning phone call.  Discussed options with her per Mitesh.   Patient unsure of which way to go, decided she will trial her current dosage a few more weeks to see if current symptoms improve, and if not will call the office back to re-discuss options.  All questions and concerns addressed at this time.    SHEN LutzN RN

## 2024-04-02 ENCOUNTER — TELEPHONE (OUTPATIENT)
Dept: OBSTETRICS AND GYNECOLOGY | Facility: CLINIC | Age: 49
End: 2024-04-02
Payer: COMMERCIAL

## 2024-04-02 NOTE — TELEPHONE ENCOUNTER
Pt verified by name and .  Pt calling states she is having anxiety and sever depression.  She is working with her psychologist.  Pt states she changer her estrogen  gel to 1/25 mg. Questions if that could have any affect.  Pt is aware nurse will send message to Mitesh Guerrero for her to advise.  Pt has no questions at this time.

## 2024-04-04 ENCOUNTER — TELEPHONE (OUTPATIENT)
Dept: OBSTETRICS AND GYNECOLOGY | Facility: CLINIC | Age: 49
End: 2024-04-04
Payer: COMMERCIAL

## 2024-04-04 NOTE — TELEPHONE ENCOUNTER
Pt verified by name and .  Pt calling regarding depression and anxiety,   Pt states her psychologist wanted pt to speak to Miteshwilliam Guerrero to see if anything needs to be changed.  Pt is aware nurse will send message to Mitesh Guerrero.  Pt is aware Mitesh Guerrero will be back in the office on Monday.  Pt has no questions at this time.

## 2024-04-08 ENCOUNTER — TELEPHONE (OUTPATIENT)
Dept: OBSTETRICS AND GYNECOLOGY | Facility: CLINIC | Age: 49
End: 2024-04-08
Payer: COMMERCIAL

## 2024-04-08 NOTE — TELEPHONE ENCOUNTER
Pt verified by name and .  Pt is aware of Mitesh Guerrero's message:  Mitesh Guerrero, APRN-CNP  Claudia Rivera, RN  Caller: Unspecified (6 days ago, 10:01 AM)  Estradiol is usually helpful for both anxiety and depression but if she feels it was made worse  with the increase I am happy to decrease it. Please let me know   Pt states she will discuss with there therapist.  Pt questions if she should take progesterone.  Pt states she has no uterus.  Pt is aware nurse will send message to Mitesh Guerrero.  Pt has no questions at this time.

## 2024-04-08 NOTE — TELEPHONE ENCOUNTER
Pt verified by name and .  Pt is aware of Mitesh Guerrero's message:  Mitesh Guerrero, APRN-COLLEEN Rivera, RN  Caller: Unspecified (6 days ago, 10:01 AM)  The main reason we prescribe progesterone is to protect the uterus from the estrogen. Some people get benefits from progesterone such as improved sleep and moods; while other women feel their moods are worse on the progesterone. If she would like to try it we can or we can adjust the estrogen dose   Pt states she will talk to her therapist.  Pt has no questions at this time.

## 2024-04-15 ENCOUNTER — TELEPHONE (OUTPATIENT)
Dept: OBSTETRICS AND GYNECOLOGY | Facility: CLINIC | Age: 49
End: 2024-04-15

## 2024-04-30 ENCOUNTER — TELEPHONE (OUTPATIENT)
Dept: OBSTETRICS AND GYNECOLOGY | Facility: CLINIC | Age: 49
End: 2024-04-30
Payer: COMMERCIAL

## 2024-04-30 NOTE — TELEPHONE ENCOUNTER
Pt returned call  Pt verified by name and .  Pt is aware of Mitesh Guerrero's message:  The testosterone cream is better for decreased sex drive; I don't have any information that it would help with sleep. I would love for her to ask her provider that started the SSRI about the insomnia and suggest treatment   Pt states she has testosterone cream at home which she never started it is 1.62% gel Miriam Torres to apply 1 pump weekly.  Pt questions if she can start using it for low desire.  Pt is aware nurse will send message to Mitesh Guerrero.

## 2024-05-01 ENCOUNTER — TELEPHONE (OUTPATIENT)
Dept: OBSTETRICS AND GYNECOLOGY | Facility: CLINIC | Age: 49
End: 2024-05-01
Payer: COMMERCIAL

## 2024-05-01 DIAGNOSIS — N95.1 MENOPAUSAL SYNDROME ON HORMONE REPLACEMENT THERAPY: Primary | ICD-10-CM

## 2024-05-01 DIAGNOSIS — Z79.890 MENOPAUSAL SYNDROME ON HORMONE REPLACEMENT THERAPY: Primary | ICD-10-CM

## 2024-05-01 NOTE — PROGRESS NOTES
Pt has testosterone at home, would like to start with one pump weekly for HSDD and will recheck labs in one month

## 2024-05-01 NOTE — TELEPHONE ENCOUNTER
Left message for pt to return call.  That is fine to start with one pump weekly and I would like her to check her levels in one month, I placed an order

## 2024-05-01 NOTE — TELEPHONE ENCOUNTER
Pt verified by name and .  Pt is aware of Mitesh Lamb's message:  That is fine to start with one pump weekly and I would like her to check her levels in one month, I placed an order  Pt has no questions at this time.          Mitesh Guerrero, ELÍAS-COLLEEN Rivera, BELLA  Caller: Unspecified (Yesterday, 10:01 AM)  That is fine to start with one pump weekly and I would like her to check her levels in one month, I placed an order

## 2024-06-28 ENCOUNTER — TELEPHONE (OUTPATIENT)
Dept: OBSTETRICS AND GYNECOLOGY | Facility: CLINIC | Age: 49
End: 2024-06-28

## 2024-07-01 DIAGNOSIS — N95.2 VAGINAL ATROPHY: Primary | ICD-10-CM

## 2024-07-01 RX ORDER — ESTRADIOL 0.1 MG/G
CREAM VAGINAL
Qty: 42.5 G | Refills: 1 | Status: SHIPPED | OUTPATIENT
Start: 2024-07-01

## 2024-07-01 NOTE — PROGRESS NOTES
Recently diagnosed with LS by exam; vaginal estradiol cream prescribed   Tried to restart testosterone but had AE hair loss and stopped it

## 2024-08-01 ENCOUNTER — OFFICE VISIT (OUTPATIENT)
Dept: OBSTETRICS AND GYNECOLOGY | Facility: CLINIC | Age: 49
End: 2024-08-01
Payer: COMMERCIAL

## 2024-08-01 VITALS
SYSTOLIC BLOOD PRESSURE: 124 MMHG | BODY MASS INDEX: 27.35 KG/M2 | DIASTOLIC BLOOD PRESSURE: 78 MMHG | HEART RATE: 75 BPM | HEIGHT: 64 IN | WEIGHT: 160.2 LBS

## 2024-08-01 DIAGNOSIS — K59.09 CHRONIC CONSTIPATION: ICD-10-CM

## 2024-08-01 DIAGNOSIS — N80.9 ENDOMETRIOSIS DETERMINED BY LAPAROSCOPY: Primary | ICD-10-CM

## 2024-08-01 DIAGNOSIS — Z11.3 ROUTINE SCREENING FOR STI (SEXUALLY TRANSMITTED INFECTION): ICD-10-CM

## 2024-08-01 PROCEDURE — 99214 OFFICE O/P EST MOD 30 MIN: CPT | Performed by: STUDENT IN AN ORGANIZED HEALTH CARE EDUCATION/TRAINING PROGRAM

## 2024-08-01 PROCEDURE — 87661 TRICHOMONAS VAGINALIS AMPLIF: CPT | Performed by: STUDENT IN AN ORGANIZED HEALTH CARE EDUCATION/TRAINING PROGRAM

## 2024-08-01 PROCEDURE — 3008F BODY MASS INDEX DOCD: CPT | Performed by: STUDENT IN AN ORGANIZED HEALTH CARE EDUCATION/TRAINING PROGRAM

## 2024-08-01 RX ORDER — ZINC GLUCONATE 50 MG
1 TABLET ORAL 2 TIMES DAILY
Qty: 120 CAPSULE | Refills: 2 | Status: SHIPPED | OUTPATIENT
Start: 2024-08-01 | End: 2024-10-30

## 2024-08-01 RX ORDER — FLUOXETINE HYDROCHLORIDE 40 MG/1
1 CAPSULE ORAL
COMMUNITY
Start: 2024-06-03

## 2024-08-01 ASSESSMENT — ENCOUNTER SYMPTOMS
EYES NEGATIVE: 0
GASTROINTESTINAL NEGATIVE: 0
HEMATOLOGIC/LYMPHATIC NEGATIVE: 0
PSYCHIATRIC NEGATIVE: 0
ALLERGIC/IMMUNOLOGIC NEGATIVE: 0
MUSCULOSKELETAL NEGATIVE: 0
CONSTITUTIONAL NEGATIVE: 0
CARDIOVASCULAR NEGATIVE: 0
ENDOCRINE NEGATIVE: 0
RESPIRATORY NEGATIVE: 0
NEUROLOGICAL NEGATIVE: 0

## 2024-08-01 ASSESSMENT — PAIN SCALES - GENERAL: PAINLEVEL: 0-NO PAIN

## 2024-08-01 NOTE — PROGRESS NOTES
"Division of Minimally Invasive Gynecologic Surgery  [facility]    08/01/24 Gynecology Visit    CC:   Chief Complaint   Patient presents with    Follow-up     Hysterectomy 1 yr follow up       Miriam Torres is a 49 y.o. perimenopausal pt with path-proven endometriosis presents for follow up.     S/p TRH-BS excision of endometriosis on 6/2023. Further paps not indicated.     Hx of mixed IBS. Now struggling w/ constipation requiring use of a laxative (with stimulant) once per week. She has used Miralax in the past and did not like this.     New dx of lichen sclerosis, on clobetasol and vaginal estrogen. Also using topical estrogen cream for HRT w/o progesterone, but overall doing well w/ regard to pain symptoms. Does not feel well on progesterones.     Last colonoscopy: Coming up   Last mammogram: 9/2023 BIRADS 1   Last pap: no further indication for pap smears     PMHx, PSHx, SHx, Allergies, and Medications updated in Epic.    ROS: reviewed and negative    PE: /78   Pulse 75   Ht 1.613 m (5' 3.5\")   Wt 72.7 kg (160 lb 3.2 oz)   LMP 04/03/2023   BMI 27.93 kg/m²    Constitutional:  No acute distress, well-nourished and well-developed  HEENT: EOM grossly intact, MMM, neck supple and with full ROM  Pulm:  Effort normal. No accessory muscle usage.  No respiratory distress.  :  - EGBUS: grossly WNL   - Speculum: vaginal and cervical mucosa grossly WNL, no trauma or lesions  Neurological:  She is alert and oriented to person place and time.  Skin: Warm, no pallor.  Psychiatric:  She has normal mood and affect.    A/P: Miriam Torres is a 49 y.o. with path-proven endometriosis presents for follow up.   - MRI pelvis for endometriosis surveillance  - Yoga for pelvic pain for mild symptoms of pelvic pain. Continue vaginal estrogen/estrogen HRT cream. No significant endo sx at this time, so will defer progesterone  - Constipation: MgOx, as this has worked for her in the past. GI " follow up.   - GC/CT/trichomonas collected today in clinic. Blood work for remainder of STI screening ordered.     Michelle Leonard MD  Division of Minimally Invasive Gynecologic Surgery  [facility]

## 2024-08-02 LAB — T VAGINALIS RRNA SPEC QL NAA+PROBE: NEGATIVE

## 2024-08-12 ENCOUNTER — TELEPHONE (OUTPATIENT)
Dept: OBSTETRICS AND GYNECOLOGY | Facility: CLINIC | Age: 49
End: 2024-08-12
Payer: COMMERCIAL

## 2024-08-12 NOTE — TELEPHONE ENCOUNTER
Patient called in regarding order for MRI. Patient would like to know if the MRI is also for her back for possible endometriosis.   [phone]

## 2024-08-13 ENCOUNTER — OFFICE VISIT (OUTPATIENT)
Dept: GASTROENTEROLOGY | Facility: CLINIC | Age: 49
End: 2024-08-13
Payer: COMMERCIAL

## 2024-08-13 VITALS
TEMPERATURE: 97.7 F | HEIGHT: 63 IN | DIASTOLIC BLOOD PRESSURE: 83 MMHG | WEIGHT: 165 LBS | HEART RATE: 70 BPM | BODY MASS INDEX: 29.23 KG/M2 | SYSTOLIC BLOOD PRESSURE: 123 MMHG

## 2024-08-13 DIAGNOSIS — K59.09 CHRONIC CONSTIPATION: ICD-10-CM

## 2024-08-13 DIAGNOSIS — R15.9 INCONTINENCE OF FECES, UNSPECIFIED FECAL INCONTINENCE TYPE: ICD-10-CM

## 2024-08-13 DIAGNOSIS — K92.1 HEMATOCHEZIA: ICD-10-CM

## 2024-08-13 DIAGNOSIS — D12.6 TUBULAR ADENOMA OF COLON: Primary | ICD-10-CM

## 2024-08-13 PROCEDURE — 99214 OFFICE O/P EST MOD 30 MIN: CPT | Performed by: NURSE PRACTITIONER

## 2024-08-13 PROCEDURE — 3008F BODY MASS INDEX DOCD: CPT | Performed by: NURSE PRACTITIONER

## 2024-08-13 PROCEDURE — 1036F TOBACCO NON-USER: CPT | Performed by: NURSE PRACTITIONER

## 2024-08-13 PROCEDURE — 99204 OFFICE O/P NEW MOD 45 MIN: CPT | Performed by: NURSE PRACTITIONER

## 2024-08-13 RX ORDER — ARIPIPRAZOLE 2 MG/1
TABLET ORAL
COMMUNITY
Start: 2024-04-15 | End: 2024-08-13

## 2024-08-13 RX ORDER — SPIRONOLACTONE 50 MG/1
50 TABLET, FILM COATED ORAL 2 TIMES DAILY
COMMUNITY
End: 2024-08-13

## 2024-08-13 RX ORDER — ARIPIPRAZOLE 10 MG/1
10 TABLET ORAL
COMMUNITY
Start: 2024-04-11 | End: 2024-08-13

## 2024-08-13 RX ORDER — PROGESTERONE 100 MG/1
1 CAPSULE ORAL NIGHTLY
COMMUNITY
Start: 2023-06-20 | End: 2024-08-13

## 2024-08-13 RX ORDER — VILAZODONE HYDROCHLORIDE 10 MG/1
1 TABLET ORAL
COMMUNITY
Start: 2023-10-03 | End: 2024-08-13

## 2024-08-13 RX ORDER — BUPROPION HYDROCHLORIDE 150 MG/1
150 TABLET ORAL
COMMUNITY
Start: 2024-03-28 | End: 2024-08-13

## 2024-08-13 RX ORDER — POLYETHYLENE GLYCOL 3350, SODIUM SULFATE ANHYDROUS, SODIUM BICARBONATE, SODIUM CHLORIDE, POTASSIUM CHLORIDE 236; 22.74; 6.74; 5.86; 2.97 G/4L; G/4L; G/4L; G/4L; G/4L
4 POWDER, FOR SOLUTION ORAL ONCE
Qty: 4000 ML | Refills: 0 | Status: SHIPPED | OUTPATIENT
Start: 2024-08-13 | End: 2024-08-13

## 2024-08-13 RX ORDER — BISACODYL 5 MG
5 TABLET, DELAYED RELEASE (ENTERIC COATED) ORAL AS NEEDED
COMMUNITY

## 2024-08-13 RX ORDER — LACTULOSE 10 G/15ML
20 SOLUTION ORAL 4 TIMES DAILY PRN
Qty: 3600 ML | Refills: 5 | Status: SHIPPED | OUTPATIENT
Start: 2024-08-13

## 2024-08-13 RX ORDER — ALPRAZOLAM 1 MG/1
TABLET ORAL
COMMUNITY
Start: 2024-04-24

## 2024-08-13 ASSESSMENT — ENCOUNTER SYMPTOMS
LIGHT-HEADEDNESS: 0
WHEEZING: 0
PALPITATIONS: 0
FREQUENCY: 0
DIZZINESS: 0
PHOTOPHOBIA: 0
COUGH: 0
DIAPHORESIS: 0
ARTHRALGIAS: 0
FLANK PAIN: 0
HEMATURIA: 0
JOINT SWELLING: 0
FEVER: 0
BACK PAIN: 0
ADENOPATHY: 0
HEADACHES: 0
NERVOUS/ANXIOUS: 0
FATIGUE: 0
SORE THROAT: 0
HALLUCINATIONS: 0
CHILLS: 0
WEAKNESS: 0
MYALGIAS: 0
SHORTNESS OF BREATH: 0
NUMBNESS: 0
DYSURIA: 0
AGITATION: 0
EYE PAIN: 0

## 2024-08-13 ASSESSMENT — PAIN SCALES - GENERAL: PAINLEVEL: 3

## 2024-08-13 NOTE — PATIENT INSTRUCTIONS
Thanks for coming to the GI clinic.     Start lactulose 30 ml four times a day as needed for constipation.     Continue oral bisacodyl (stimulant laxative), but use sparingly.     I would like you to get a colonoscopy. Please call [phone] to schedule.   Please read all of the instructions 7 days before your colonoscopy.   You will need to take ONLY clear liquids the ENTIRE day before your procedure. These include (clear fruit juices, soda, Gatorade, broth, jello and coffee/tea) Avoid red and purple drinks. No cream or milk in the coffee.   You will need to take a bowel preparation.   You will also need a .      Follow up in clinic 3 weeks after completion of the colonoscopy.

## 2024-08-13 NOTE — PROGRESS NOTES
Subjective   Patient ID: Miriam Torres is a 49 y.o. female who presents for constipation.    This is a 49 year old WF with history of anxiety/depression, IBS, PUD, vertigo, LAURA, and  biopsy-confirmed endometriosis who is presenting to the GI clinic for an initial visit.     History per pt and extensive review of EMR    She's seen both [facility] and  GI (Dr. Munguia) in the past.     Reports having a lot of vaginal yeast infections in her teens and 20s.     She's never been on a gluten free diet.     Reports being formally diagnosed with IBS at age 13. From age [age] she dealt with IBS with constipation and diarrhea. As of more recent, though,  she's been dealing with chronic constipation. She takes a pink stimulant laxative twice a week (presumably oral bisacodyl) with only some relief.     Reports recent fecal incontinence associated with passage of flatulence.     Report having an episode of painless rectal bleeding last month when she was on spironolactone. She saw blood in the toilet bowl water and mixed into her stool at that time.     She also had abdominal cramping when she was taking spironolactone, but this has basically resolved.     Reports taking spironolactone for alopecia of the scalp.     Denies unintentional weight loss, heartburn, obstipation, decrease in stool caliber, and melena.     Diet includes a lot of fruits and vegetables.  Drinks a lot of water.     She tried fiber supplements, Miralax, senna, and enemas without relief of constipation.     Underwent EGD/colonoscopy 11/6/19 with Dr. Munguia with . EGD noted erosive gastritis, duodenitis, and multiple small gastric ulcers. Gastric biopsy noted reactive gastropathy (H pylori negative). Duodenal biopsy noted focal foveolar hyperplasia. Colonoscopy noted one 5 mm tubular adenoma which was removed from the cecum, melanosis in the colon, and internal/external hemorrhoids.     Surveillance EGD 12/20/19 noted mild gastritis.     Past medical  history:   See above     Past surgical history:   Robotic-assisted total hysterectomy, bilateral salpingectomy, bilateral ovarian cystectomy, excision of endometriosis, cystoscopy, lysis of adhesions, bilateral ureterolysis (6/2023 )     Family history:   No GI cancers, IBD, and celiac disease   Mother's side of family- IBS     Social history  Social alcohol use; denies issues with alcohol abuse  Denies use of tobacco and illicit drugs       Review of Systems   Constitutional:  Negative for chills, diaphoresis, fatigue and fever.   HENT:  Negative for congestion, ear pain, hearing loss, sneezing and sore throat.    Eyes:  Negative for photophobia, pain and visual disturbance.   Respiratory:  Negative for cough, shortness of breath and wheezing.    Cardiovascular:  Negative for chest pain, palpitations and leg swelling.   Endocrine: Negative for cold intolerance and heat intolerance.   Genitourinary:  Negative for dysuria, flank pain, frequency and hematuria.   Musculoskeletal:  Negative for arthralgias, back pain, gait problem, joint swelling and myalgias.   Skin:  Negative for rash.   Neurological:  Negative for dizziness, syncope, weakness, light-headedness, numbness and headaches.   Hematological:  Negative for adenopathy.   Psychiatric/Behavioral:  Negative for agitation and hallucinations. The patient is not nervous/anxious.        Allergies   Allergen Reactions    Metronidazole Rash     Current Outpatient Medications   Medication Sig Dispense Refill    ALPRAZolam (Xanax) 1 mg tablet TAKE ONE TABLET BY MOUTH DAILY AS NEEDED FOR INSOMNIA      bisacodyl (Dulcolax) 5 mg EC tablet Take 1 tablet (5 mg) by mouth if needed for constipation.      estradiol (Estrace) 0.01 % (0.1 mg/gram) vaginal cream Discard the applicator and apply a pea sized amount to the vaginal opening and just inside the vagina daily for 14 days followed by 3 times/week 42.5 g 1    estradioL 1.25 mg/1.25 gram (0.1 %) gel in packet Place 1.25 mg  "on the skin once daily. 1.25 g 11    FLUoxetine (PROzac) 40 mg capsule Take 1 capsule (40 mg) by mouth early in the morning..      fluticasone (Flonase) 50 mcg/actuation nasal spray Administer 2 sprays into each nostril if needed.      loratadine (Claritin) 10 mg tablet Take 1 tablet (10 mg) by mouth if needed.      magnesium oxide 500 mg capsule Take 1 capsule (500 mg) by mouth 2 times a day. 120 capsule 2    MELATONIN ORAL Take by mouth as needed at bedtime.      lactulose 20 gram/30 mL oral solution Take 30 mL (20 g) by mouth 4 times a day as needed (constipation). 3600 mL 5    polyethylene glycol (Golytely) 236-22.74-6.74 -5.86 gram solution Take 4,000 mL by mouth 1 time for 1 dose. Use as directed by  endoscopy department for colonoscopy 4000 mL 0     No current facility-administered medications for this visit.      Objective     /83   Pulse 70   Temp 36.5 °C (97.7 °F)   Ht 1.6 m (5' 3\")   Wt 74.8 kg (165 lb)   LMP 04/03/2023   BMI 29.23 kg/m²     Physical Exam  Constitutional:       General: She is not in acute distress.  HENT:      Head: Normocephalic and atraumatic.   Eyes:      Conjunctiva/sclera: Conjunctivae normal.   Cardiovascular:      Rate and Rhythm: Normal rate and regular rhythm.      Heart sounds: No murmur heard.     No gallop.   Pulmonary:      Effort: Pulmonary effort is normal.      Breath sounds: Normal breath sounds.   Abdominal:      General: Bowel sounds are normal. There is no distension.      Tenderness: There is no abdominal tenderness. There is no guarding.   Musculoskeletal:         General: No swelling or deformity. Normal range of motion.      Cervical back: Normal range of motion. No rigidity.   Skin:     General: Skin is warm and dry.      Coloration: Skin is not jaundiced.      Findings: No lesion or rash.   Neurological:      General: No focal deficit present.      Mental Status: She is alert and oriented to person, place, and time.   Psychiatric:         Mood and " Affect: Mood normal.         Assessment/Plan   Problem List Items Addressed This Visit       Chronic constipation    Relevant Medications    lactulose 20 gram/30 mL oral solution     Other Visit Diagnoses       Tubular adenoma of colon    -  Primary    Relevant Medications    polyethylene glycol (Golytely) 236-22.74-6.74 -5.86 gram solution    Other Relevant Orders    Colonoscopy Diagnostic    Hematochezia        Relevant Medications    polyethylene glycol (Golytely) 236-22.74-6.74 -5.86 gram solution    Other Relevant Orders    Colonoscopy Diagnostic    Incontinence of feces, unspecified fecal incontinence type               Chronic constipation: not well controlled and refractory to many types of laxatives.   - start lactulose 20 grams QID as needed  - continue oral bisacodyl, but advise to use sparingly   - consider Linzess pending the above    2. History of tubular adenoma of colon:  - will proceed with surveillance colonoscopy   - Golytely split bowel prep     3. Hematochezia: potentially related to known hemorrhoids. Also consider polyps and colorectal cancer.  - colonoscopy as above    4. Fecal incontinence: potentially related to overflow incontinence in the setting of constipation.  - colonoscopy as above    5. Follow up:  - return to clinic 3 weeks after the completion of colonoscopy

## 2024-08-14 ENCOUNTER — TELEPHONE (OUTPATIENT)
Dept: GASTROENTEROLOGY | Facility: CLINIC | Age: 49
End: 2024-08-14
Payer: COMMERCIAL

## 2024-08-14 NOTE — TELEPHONE ENCOUNTER
Pt called to get her colonoscopy scheduled today. Colonoscopy ordered by Dr. Lipscomb. Rozina advise.  Meds to hold?   Location?   Prep?

## 2024-08-20 ENCOUNTER — LAB (OUTPATIENT)
Dept: LAB | Facility: LAB | Age: 49
End: 2024-08-20
Payer: COMMERCIAL

## 2024-08-20 ENCOUNTER — TELEPHONE (OUTPATIENT)
Dept: GASTROENTEROLOGY | Facility: HOSPITAL | Age: 49
End: 2024-08-20

## 2024-08-20 ENCOUNTER — TELEPHONE (OUTPATIENT)
Dept: RADIOLOGY | Facility: CLINIC | Age: 49
End: 2024-08-20
Payer: COMMERCIAL

## 2024-08-20 DIAGNOSIS — R39.15 URINARY URGENCY: ICD-10-CM

## 2024-08-20 DIAGNOSIS — Z11.3 ROUTINE SCREENING FOR STI (SEXUALLY TRANSMITTED INFECTION): ICD-10-CM

## 2024-08-20 LAB
APPEARANCE UR: CLEAR
BILIRUB UR STRIP.AUTO-MCNC: NEGATIVE MG/DL
COLOR UR: ABNORMAL
GLUCOSE UR STRIP.AUTO-MCNC: NORMAL MG/DL
KETONES UR STRIP.AUTO-MCNC: NEGATIVE MG/DL
LEUKOCYTE ESTERASE UR QL STRIP.AUTO: NEGATIVE
MUCOUS THREADS #/AREA URNS AUTO: NORMAL /LPF
NITRITE UR QL STRIP.AUTO: NEGATIVE
PH UR STRIP.AUTO: 5 [PH]
PROT UR STRIP.AUTO-MCNC: NEGATIVE MG/DL
RBC # UR STRIP.AUTO: ABNORMAL /UL
RBC #/AREA URNS AUTO: NORMAL /HPF
SP GR UR STRIP.AUTO: 1.01
SQUAMOUS #/AREA URNS AUTO: NORMAL /HPF
UROBILINOGEN UR STRIP.AUTO-MCNC: NORMAL MG/DL
WBC #/AREA URNS AUTO: NORMAL /HPF

## 2024-08-20 PROCEDURE — 87491 CHLMYD TRACH DNA AMP PROBE: CPT

## 2024-08-20 PROCEDURE — 87591 N.GONORRHOEAE DNA AMP PROB: CPT

## 2024-08-20 PROCEDURE — 81001 URINALYSIS AUTO W/SCOPE: CPT

## 2024-08-20 NOTE — TELEPHONE ENCOUNTER
Patient complains or urgency on urination and the feeling of not completely emptying her bladder.  Patient denies fever or gross blood in urine.    Advised to monitor symptom and if worsening or fever develops to go to PCP or urgent care.    Will check with Dr. Leonard for orders for urinary symptoms.    Patient's GC/CT swab was not run, will need to re test.  Can do with urine test in any  lab.

## 2024-08-20 NOTE — TELEPHONE ENCOUNTER
Patient advised may do labs now at any  lab.  Patient is agreeable.  Mychart pending, patient will call on Thursday, if does not hear from us first for results.

## 2024-08-21 DIAGNOSIS — D12.6 TUBULAR ADENOMA OF COLON: ICD-10-CM

## 2024-08-21 LAB
C TRACH RRNA SPEC QL NAA+PROBE: NEGATIVE
HOLD SPECIMEN: NORMAL
N GONORRHOEA DNA SPEC QL PROBE+SIG AMP: NEGATIVE

## 2024-08-21 RX ORDER — POLYETHYLENE GLYCOL 3350, SODIUM SULFATE ANHYDROUS, SODIUM BICARBONATE, SODIUM CHLORIDE, POTASSIUM CHLORIDE 236; 22.74; 6.74; 5.86; 2.97 G/4L; G/4L; G/4L; G/4L; G/4L
POWDER, FOR SOLUTION ORAL
Qty: 4000 ML | Refills: 0 | Status: SHIPPED | OUTPATIENT
Start: 2024-08-21

## 2024-08-22 ENCOUNTER — DOCUMENTATION (OUTPATIENT)
Dept: GASTROENTEROLOGY | Facility: CLINIC | Age: 49
End: 2024-08-22
Payer: COMMERCIAL

## 2024-08-22 NOTE — PROGRESS NOTES
Spoke with pt via telephone this afternoon about continued symptoms. She is passing mucus and blood per rectum (sometimes without any stool). She is going several days without moving her bowels. She is highly concerned and is asking about stool studies for infection. Of note, she denies any recent international travel.     I explained to pt that in the absence of diarrhea, fever, chills, or other signs of infection, there is no indication to obtain stool studies for infection.  I do feel it's reasonable to get her colonoscopy moved up, though, and I will attempt to do so. Currently she is scheduled for colonoscopy on 11/8/24 with Dr. Brian Munguia.

## 2024-08-23 ENCOUNTER — TELEPHONE (OUTPATIENT)
Dept: OBSTETRICS AND GYNECOLOGY | Facility: CLINIC | Age: 49
End: 2024-08-23
Payer: COMMERCIAL

## 2024-08-23 NOTE — TELEPHONE ENCOUNTER
Patient advised urine looks normal except trace blood, patient reports urinary pressure and urgency.    Patient has MRI scheduled for Monday so advised to follow up after for plan.   If symptoms worsen, send a message or call office.  If within normal limits, may need urology.

## 2024-08-26 ENCOUNTER — HOSPITAL ENCOUNTER (OUTPATIENT)
Dept: RADIOLOGY | Facility: HOSPITAL | Age: 49
Discharge: HOME | End: 2024-08-26
Payer: COMMERCIAL

## 2024-08-26 DIAGNOSIS — N80.9 ENDOMETRIOSIS DETERMINED BY LAPAROSCOPY: ICD-10-CM

## 2024-08-26 PROCEDURE — 72197 MRI PELVIS W/O & W/DYE: CPT | Performed by: RADIOLOGY

## 2024-08-26 PROCEDURE — 2550000001 HC RX 255 CONTRASTS: Performed by: STUDENT IN AN ORGANIZED HEALTH CARE EDUCATION/TRAINING PROGRAM

## 2024-08-26 PROCEDURE — A9575 INJ GADOTERATE MEGLUMI 0.1ML: HCPCS | Performed by: STUDENT IN AN ORGANIZED HEALTH CARE EDUCATION/TRAINING PROGRAM

## 2024-08-26 PROCEDURE — 72197 MRI PELVIS W/O & W/DYE: CPT

## 2024-08-26 RX ORDER — GADOTERATE MEGLUMINE 376.9 MG/ML
0.2 INJECTION INTRAVENOUS
Status: COMPLETED | OUTPATIENT
Start: 2024-08-26 | End: 2024-08-26

## 2024-09-14 ENCOUNTER — APPOINTMENT (OUTPATIENT)
Dept: RADIOLOGY | Facility: HOSPITAL | Age: 49
End: 2024-09-14
Payer: COMMERCIAL

## 2024-09-18 ENCOUNTER — APPOINTMENT (OUTPATIENT)
Dept: GASTROENTEROLOGY | Facility: CLINIC | Age: 49
End: 2024-09-18
Payer: COMMERCIAL

## 2024-09-25 ENCOUNTER — TELEPHONE (OUTPATIENT)
Dept: OBSTETRICS AND GYNECOLOGY | Facility: CLINIC | Age: 49
End: 2024-09-25
Payer: COMMERCIAL

## 2024-09-25 NOTE — TELEPHONE ENCOUNTER
Pain and lump on right breast. Would like to talk to Mitesh about this not Dr. Leonard.  Has mammogram scheduled for next Tuesday.  Will forward Mitesh.

## 2024-09-26 NOTE — TELEPHONE ENCOUNTER
Contacted pt  Name and  verified  Spoke with pt made aware of information from Mitesh  Pt will await results after mammogram for further action  Pt verbalized understanding.  No further questions or concerns at this time.

## 2024-09-27 ENCOUNTER — TELEPHONE (OUTPATIENT)
Dept: GASTROENTEROLOGY | Facility: CLINIC | Age: 49
End: 2024-09-27
Payer: COMMERCIAL

## 2024-09-27 NOTE — TELEPHONE ENCOUNTER
Miriam Berg has a CS scheduled with you 11/8/24. She's asking to add an EGD to that procedure. She recently saw Alexi Lipscomb but wasn't symptomatic until now. She last saw you in 2019 for an EGD/CS.     Symptoms: Heartburn, Choking, and swallowing issues.       Please advise     Thank You

## 2024-09-28 DIAGNOSIS — R13.10 DYSPHAGIA, UNSPECIFIED TYPE: Primary | ICD-10-CM

## 2024-10-15 ENCOUNTER — TELEPHONE (OUTPATIENT)
Dept: OBSTETRICS AND GYNECOLOGY | Facility: CLINIC | Age: 49
End: 2024-10-15
Payer: COMMERCIAL

## 2024-10-15 NOTE — TELEPHONE ENCOUNTER
Miriam is requesting a paper order for her referral to pelvic flood therapy to be mailed to her. Discussed it was sent via [employer], but patient would still like mailed to her. Referral printed and mailed.

## 2024-10-17 ENCOUNTER — TELEPHONE (OUTPATIENT)
Dept: OBSTETRICS AND GYNECOLOGY | Facility: CLINIC | Age: 49
End: 2024-10-17
Payer: COMMERCIAL

## 2024-10-17 NOTE — TELEPHONE ENCOUNTER
Pt verified by name and .  Pt calling regarding her estrogen cream.  Pt states her vaginal estrogen cream is irritating.  She get rx at [facility] .   It there a compound estrogen cream she can use?  Pt states she stopped using testosterone.  Question if it can be applied vaginally or can she use DHEA? She is having urgency and low desire.  Pt questions if Mitesh Guerrero is familiar with red/blue light vaginal therapy.  Pt states she needs refill on 100 mg progesterone to be filled with [facility].  Pt states Dr. Leonard was to have contacted Mitesh Guerrero regarding pt.  Pt is aware nurse will send message to Mitesh Guerrero.  Pt has annual scheduled for 2/10/2025.

## 2024-10-21 ENCOUNTER — TELEPHONE (OUTPATIENT)
Dept: OBSTETRICS AND GYNECOLOGY | Facility: CLINIC | Age: 49
End: 2024-10-21
Payer: COMMERCIAL

## 2024-10-23 ENCOUNTER — LAB (OUTPATIENT)
Dept: LAB | Facility: LAB | Age: 49
End: 2024-10-23
Payer: COMMERCIAL

## 2024-10-23 ENCOUNTER — TELEPHONE (OUTPATIENT)
Dept: OBSTETRICS AND GYNECOLOGY | Facility: CLINIC | Age: 49
End: 2024-10-23
Payer: COMMERCIAL

## 2024-10-23 DIAGNOSIS — Z11.3 ROUTINE SCREENING FOR STI (SEXUALLY TRANSMITTED INFECTION): ICD-10-CM

## 2024-10-23 DIAGNOSIS — R53.83 OTHER FATIGUE: ICD-10-CM

## 2024-10-23 LAB
25(OH)D3 SERPL-MCNC: 33 NG/ML (ref 30–100)
ERYTHROCYTE [DISTWIDTH] IN BLOOD BY AUTOMATED COUNT: 12.4 % (ref 11.5–14.5)
FERRITIN SERPL-MCNC: 113 NG/ML (ref 8–150)
HBV SURFACE AG SERPL QL IA: NONREACTIVE
HCT VFR BLD AUTO: 41.3 % (ref 36–46)
HCV AB SER QL: NONREACTIVE
HGB BLD-MCNC: 13.4 G/DL (ref 12–16)
HIV 1+2 AB+HIV1 P24 AG SERPL QL IA: NONREACTIVE
IRON SATN MFR SERPL: 36 % (ref 25–45)
IRON SERPL-MCNC: 119 UG/DL (ref 35–150)
MCH RBC QN AUTO: 28.8 PG (ref 26–34)
MCHC RBC AUTO-ENTMCNC: 32.4 G/DL (ref 32–36)
MCV RBC AUTO: 89 FL (ref 80–100)
NRBC BLD-RTO: 0 /100 WBCS (ref 0–0)
PLATELET # BLD AUTO: 215 X10*3/UL (ref 150–450)
RBC # BLD AUTO: 4.66 X10*6/UL (ref 4–5.2)
TIBC SERPL-MCNC: 328 UG/DL (ref 240–445)
TREPONEMA PALLIDUM IGG+IGM AB [PRESENCE] IN SERUM OR PLASMA BY IMMUNOASSAY: NONREACTIVE
TSH SERPL-ACNC: 1.91 MIU/L (ref 0.44–3.98)
UIBC SERPL-MCNC: 209 UG/DL (ref 110–370)
WBC # BLD AUTO: 6 X10*3/UL (ref 4.4–11.3)

## 2024-10-23 PROCEDURE — 85027 COMPLETE CBC AUTOMATED: CPT

## 2024-10-23 PROCEDURE — 83550 IRON BINDING TEST: CPT

## 2024-10-23 PROCEDURE — 82306 VITAMIN D 25 HYDROXY: CPT

## 2024-10-23 PROCEDURE — 36415 COLL VENOUS BLD VENIPUNCTURE: CPT

## 2024-10-23 PROCEDURE — 87340 HEPATITIS B SURFACE AG IA: CPT

## 2024-10-23 PROCEDURE — 84443 ASSAY THYROID STIM HORMONE: CPT

## 2024-10-23 PROCEDURE — 87389 HIV-1 AG W/HIV-1&-2 AB AG IA: CPT

## 2024-10-23 PROCEDURE — 82728 ASSAY OF FERRITIN: CPT

## 2024-10-23 PROCEDURE — 83540 ASSAY OF IRON: CPT

## 2024-10-23 PROCEDURE — 86780 TREPONEMA PALLIDUM: CPT

## 2024-10-23 PROCEDURE — 86803 HEPATITIS C AB TEST: CPT

## 2024-10-23 NOTE — TELEPHONE ENCOUNTER
Pt verified by name and .  Pt is aware Mitesh Guerrero would like her to schedule a virtual appt.  Nurse scheduled pt for 2024 at 1:40 PM.  Pt has no questions at this time.

## 2024-11-04 ENCOUNTER — APPOINTMENT (OUTPATIENT)
Dept: OBSTETRICS AND GYNECOLOGY | Facility: CLINIC | Age: 49
End: 2024-11-04
Payer: COMMERCIAL

## 2024-11-04 DIAGNOSIS — N95.2 VAGINAL ATROPHY: ICD-10-CM

## 2024-11-04 DIAGNOSIS — N95.8 GENITOURINARY SYNDROME OF MENOPAUSE: ICD-10-CM

## 2024-11-04 DIAGNOSIS — N95.1 MENOPAUSAL SYNDROME ON HORMONE REPLACEMENT THERAPY: Primary | ICD-10-CM

## 2024-11-04 DIAGNOSIS — Z79.890 MENOPAUSAL SYNDROME ON HORMONE REPLACEMENT THERAPY: Primary | ICD-10-CM

## 2024-11-04 DIAGNOSIS — F52.0 HYPOACTIVE SEXUAL DESIRE DISORDER: ICD-10-CM

## 2024-11-04 PROCEDURE — 99212 OFFICE O/P EST SF 10 MIN: CPT | Performed by: NURSE PRACTITIONER

## 2024-11-04 RX ORDER — PRASTERONE 6.5 MG/1
6.5 INSERT VAGINAL NIGHTLY
Qty: 28 EACH | Refills: 11 | Status: SHIPPED | OUTPATIENT
Start: 2024-11-04 | End: 2024-11-04

## 2024-11-04 RX ORDER — FLIBANSERIN 100 MG/1
100 TABLET, FILM COATED ORAL DAILY
Qty: 30 TABLET | Refills: 11 | Status: SHIPPED | OUTPATIENT
Start: 2024-11-04

## 2024-11-04 RX ORDER — PRASTERONE 6.5 MG/1
6.5 INSERT VAGINAL NIGHTLY
Qty: 28 EACH | Refills: 11 | Status: SHIPPED | OUTPATIENT
Start: 2024-11-04 | End: 2024-12-04

## 2024-11-04 NOTE — PROGRESS NOTES
Subjective   Patient ID: Miriam Torres is a 49 y.o. female who presents for No chief complaint on file..  HPI  h/o endometriosis and hysterectomy   Feels she has mast cell activation  H/o AE progesterone  Current bothersome symptoms: increased anxiety, irritability and anger; on Prozac  urinary urgency and frequency  Occasional fatigue  No sleep difficulties  VMS: none    Currently on estradiol gel, having some skin irritation  Vaginal itching  Not sexually active  C/o HSDD; testosterone prescribed 9/2023; felt that she was losing hair and discontinued it    Review of Systems    Objective   Physical Exam    Assessment/Plan   Diagnoses and all orders for this visit:  Menopausal syndrome on hormone replacement therapy  Hypoactive sexual desire disorder  -     flibanserin (Addyi) 100 mg tablet; Take 100 mg by mouth once daily. Take at bedtime  Vaginal atrophy  -     prasterone, dhea, (Intrarosa) 6.5 mg insert; Insert 6.5 mg into the vagina once daily at bedtime.  Genitourinary syndrome of menopause       Pt has chosen Addyi for the treatment of HSDD. Addyi works to improve desire in the brain, it will not effect arousal. AE including drowsiness and dizziness and weight loss. Pt advised to stop drinking alcohol 3 hour prior to taking her dose and to not resume drinking till the next day. If pt consumes 3 or more alcoholic drinks that evening; pt advised to not take her dose of Addyi that evening. Discussed with pt that Addyi is effective with women 50% of the time and she needs to trial it for 12 weeks to determine if it is effective for her.     Genitourinary syndrome of menopause is very common and undertreated in perimenopausal and menopausal women. It is due to the low levels of estrogen and sex steroids. Common symptoms include: vaginal dryness, pain with sex, frequent urinary tract infections, burning, itching, and irritation. Treatment options can include vaginal moisturizers and lubricants and prescription  vaginal estrogen, intravaginal DHEA and oral Osphena. The prescription options have similar efficacy and low risk. However, there are black box warnings on the vaginal estrogen products and Osphena that are based off of studies of systemic HT in the [facility] study, not studies done on vaginal estrogen or Osphena.    The prescription options may take up to 12 weeks to alleviate symptoms and if discontinued, the symptoms will return.      We discussed that Intrarosa may also help with HSDD  We also discussed Femring as an option d/t AE CD from gel, she will consider but she is concerned about transfer to partners    Mitesh Guerrero, ELÍAS-CNP 11/04/24 1:44 PM

## 2024-11-05 ENCOUNTER — TELEPHONE (OUTPATIENT)
Dept: OBSTETRICS AND GYNECOLOGY | Facility: CLINIC | Age: 49
End: 2024-11-05
Payer: COMMERCIAL

## 2024-11-08 ENCOUNTER — APPOINTMENT (OUTPATIENT)
Dept: GASTROENTEROLOGY | Facility: EXTERNAL LOCATION | Age: 49
End: 2024-11-08
Payer: COMMERCIAL

## 2024-11-11 ENCOUNTER — TELEPHONE (OUTPATIENT)
Dept: OBSTETRICS AND GYNECOLOGY | Facility: CLINIC | Age: 49
End: 2024-11-11
Payer: COMMERCIAL

## 2024-11-12 ENCOUNTER — APPOINTMENT (OUTPATIENT)
Dept: GASTROENTEROLOGY | Facility: EXTERNAL LOCATION | Age: 49
End: 2024-11-12
Payer: COMMERCIAL

## 2024-11-15 ENCOUNTER — TELEPHONE (OUTPATIENT)
Dept: OBSTETRICS AND GYNECOLOGY | Facility: CLINIC | Age: 49
End: 2024-11-15
Payer: COMMERCIAL

## 2024-11-15 DIAGNOSIS — F52.0 HYPOACTIVE SEXUAL DESIRE DISORDER: Primary | ICD-10-CM

## 2024-11-15 NOTE — TELEPHONE ENCOUNTER
Pt verified by name and .  Pt calling states addyi is not covered by insurance.  Pt is aware nurse will send message to Mitesh to place referral with Liset Burns.  Pt states she has not received intrarosa rx.  Pt aware rx was sent 2024.  Pt states she will call pharmacy.  Pt has no questions at this time.

## 2024-11-25 ENCOUNTER — APPOINTMENT (OUTPATIENT)
Dept: RADIOLOGY | Facility: HOSPITAL | Age: 49
End: 2024-11-25
Payer: COMMERCIAL

## 2024-11-27 ENCOUNTER — TELEPHONE (OUTPATIENT)
Dept: OBSTETRICS AND GYNECOLOGY | Facility: CLINIC | Age: 49
End: 2024-11-27
Payer: COMMERCIAL

## 2024-11-27 DIAGNOSIS — F52.0 HYPOACTIVE SEXUAL DESIRE DISORDER: ICD-10-CM

## 2024-11-27 DIAGNOSIS — N95.2 VAGINAL ATROPHY: ICD-10-CM

## 2024-11-30 RX ORDER — PRASTERONE 6.5 MG/1
6.5 INSERT VAGINAL NIGHTLY
Qty: 28 EACH | Refills: 11 | Status: SHIPPED | OUTPATIENT
Start: 2024-11-30 | End: 2024-12-30

## 2024-11-30 RX ORDER — FLIBANSERIN 100 MG/1
100 TABLET, FILM COATED ORAL DAILY
Qty: 30 TABLET | Refills: 11 | Status: SHIPPED | OUTPATIENT
Start: 2024-11-30

## 2024-12-04 ENCOUNTER — HOSPITAL ENCOUNTER (OUTPATIENT)
Dept: RADIOLOGY | Facility: HOSPITAL | Age: 49
Discharge: HOME | End: 2024-12-04
Payer: COMMERCIAL

## 2024-12-04 DIAGNOSIS — N83.201 CYST OF RIGHT OVARY: ICD-10-CM

## 2024-12-04 PROCEDURE — A9575 INJ GADOTERATE MEGLUMI 0.1ML: HCPCS | Performed by: STUDENT IN AN ORGANIZED HEALTH CARE EDUCATION/TRAINING PROGRAM

## 2024-12-04 PROCEDURE — 2550000001 HC RX 255 CONTRASTS: Performed by: STUDENT IN AN ORGANIZED HEALTH CARE EDUCATION/TRAINING PROGRAM

## 2024-12-04 PROCEDURE — 72197 MRI PELVIS W/O & W/DYE: CPT

## 2024-12-04 PROCEDURE — 72197 MRI PELVIS W/O & W/DYE: CPT | Performed by: RADIOLOGY

## 2024-12-04 RX ORDER — GADOTERATE MEGLUMINE 376.9 MG/ML
0.2 INJECTION INTRAVENOUS
Status: COMPLETED | OUTPATIENT
Start: 2024-12-04 | End: 2024-12-04

## 2024-12-05 ENCOUNTER — TELEPHONE (OUTPATIENT)
Dept: OBSTETRICS AND GYNECOLOGY | Facility: CLINIC | Age: 49
End: 2024-12-05
Payer: COMMERCIAL

## 2024-12-06 ENCOUNTER — TELEPHONE (OUTPATIENT)
Dept: OBSTETRICS AND GYNECOLOGY | Facility: CLINIC | Age: 49
End: 2024-12-06
Payer: COMMERCIAL

## 2024-12-06 NOTE — TELEPHONE ENCOUNTER
Pt verified by name and .  Pt states she has an appt scheduled 2024 with Liset Burns.  Pt states [facility] called her they need more information regarding her intrarosa rx.  Pt states 2024 she was diagnosed with LS at [facility].  Nurse called [facility].  Spoke to Marshall  Pt verified by name and .  Per Marshall, Berenice that works on prior auths will call nurse.

## 2024-12-13 ENCOUNTER — APPOINTMENT (OUTPATIENT)
Dept: GASTROENTEROLOGY | Facility: EXTERNAL LOCATION | Age: 49
End: 2024-12-13
Payer: COMMERCIAL

## 2024-12-13 DIAGNOSIS — K92.1 HEMATOCHEZIA: ICD-10-CM

## 2024-12-13 DIAGNOSIS — Z86.0101 HISTORY OF ADENOMATOUS POLYP OF COLON: ICD-10-CM

## 2024-12-13 DIAGNOSIS — Z12.11 ENCOUNTER FOR SCREENING COLONOSCOPY: ICD-10-CM

## 2024-12-13 DIAGNOSIS — D12.6 TUBULAR ADENOMA OF COLON: ICD-10-CM

## 2024-12-13 DIAGNOSIS — R13.10 DYSPHAGIA, UNSPECIFIED TYPE: ICD-10-CM

## 2024-12-13 DIAGNOSIS — Z87.11 PERSONAL HISTORY OF PEPTIC ULCER DISEASE: Primary | ICD-10-CM

## 2024-12-13 DIAGNOSIS — K31.7 POLYP OF STOMACH AND DUODENUM: ICD-10-CM

## 2024-12-13 DIAGNOSIS — K29.70 GASTRITIS WITHOUT BLEEDING, UNSPECIFIED CHRONICITY, UNSPECIFIED GASTRITIS TYPE: ICD-10-CM

## 2024-12-13 PROCEDURE — 88305 TISSUE EXAM BY PATHOLOGIST: CPT | Performed by: STUDENT IN AN ORGANIZED HEALTH CARE EDUCATION/TRAINING PROGRAM

## 2024-12-13 PROCEDURE — G0105 COLORECTAL SCRN; HI RISK IND: HCPCS | Performed by: INTERNAL MEDICINE

## 2024-12-13 PROCEDURE — 43239 EGD BIOPSY SINGLE/MULTIPLE: CPT | Performed by: INTERNAL MEDICINE

## 2024-12-13 PROCEDURE — 88305 TISSUE EXAM BY PATHOLOGIST: CPT

## 2024-12-16 ENCOUNTER — LAB REQUISITION (OUTPATIENT)
Dept: LAB | Facility: HOSPITAL | Age: 49
End: 2024-12-16
Payer: COMMERCIAL

## 2024-12-16 DIAGNOSIS — R13.10 DYSPHAGIA, UNSPECIFIED: ICD-10-CM

## 2024-12-16 DIAGNOSIS — D12.6 BENIGN NEOPLASM OF COLON, UNSPECIFIED: ICD-10-CM

## 2024-12-17 ENCOUNTER — TELEPHONE (OUTPATIENT)
Dept: OBSTETRICS AND GYNECOLOGY | Facility: CLINIC | Age: 49
End: 2024-12-17
Payer: COMMERCIAL

## 2024-12-17 NOTE — TELEPHONE ENCOUNTER
Patient requesting to speak to a nurse regarding a procedure that was done on Dec 4th [phone]

## 2024-12-17 NOTE — TELEPHONE ENCOUNTER
Patient completed follow up MRI.  Right ovarian cyst is bigger.  Patient says pain is not increased,  She is having bowel/constipation issues.    Scheduled for virtual appointment with Dr. Leonard 12/24.

## 2024-12-20 NOTE — PROGRESS NOTES
Patient ID: Miriam Torres is a 49 y.o. female who presents for No chief complaint on file..    Referring Provider: Mitesh Guerrero  Pt was referred for Addyi     Preferred Pharmacy:    [id number] - [place], [place] - [address]  [address]  [facility] [address]  Phone: [phone] Fax: [phone]    [facility] - [place], [place] - [address]  [address] [facility] [address]  Phone: [phone] Fax: [phone]    [facility] - [place], [place] - [address]  [address]  [address]  [facility] [address]  Phone: [phone] Fax: [phone]    [facility] - [place], [place] - [address]  [address]  [facility] [address]  Phone: [phone] Fax: [phone]      Subjective    Menopausal age: Hysterectomy at age 48    Ovaries: both remain      Any Contraindications and/or Cautions to HT:   PH/FH breast cancer: No  PH/FH of ovarian cancer: No  PH/FH of uterine cancer: No  PH/FH of colon cancer: No  PH/FH of pancreatic cancer: No  PH/FH Dementia: Maternal grandmother  Stroke, MI, VTE, clotting disorder, or congenital heart disease: No  Systemic lupus erythematosus (increased clot risk): No  H/o Gallbladder disease? No  Risk of cholelithiasis, cholecystitis, and cholecystectomy, less with transdermal because it bypasses first past metabolism.     Osteopenia or osteoporosis: No    Stress Level (rate 1-10): 9 out of 10   Recommend daily 2-5 minutes of mindfulness, meditation, journaling, etc to help reduce cortisol levels.     Depression? Yes - currently treated with fluoxetine    Anxiety? Yes- currently treated with alprazolam    GI issues? IBS-C since age 13 (endometriosis)  Frequency of BM? Requires laxative to have a BM   -Colonoscopy on Dec 13th   - Next BM 18-19th (bisacodyl x 3)   -Recommend magnesium citrate    -currently taking magnesium 5-6 capsules   Migraine? Yes, current, along with  "vertigo    Vaginal Symptoms  Dyspareunia (pain before/during/after intercourse): N/A  Vaginal Bleeding: No  Vaginal Dryness: No  Dysuria (pain, burning, stinging, or itching with urination): No  Vaginal and/or vulvar irritation/itching: Yes, lichen sclerosis, using clobetasol 2 nights/week  Recurrent urinary tract infections: No  Recurrent yeast infections: Yes, remote past (early 20's constant)    No results found for: \"ESTRADIOLFRE\", \"PROGESTERONE\", \"ESTRADIOL\", \"FSH\"    Non Pharm Mgmt:   Silicone based - caused irritation after using  Discussed uber lube and good clean love as alternative options  Previous Treatment:   Vaginal estradiol (estrace) - caused irritation  Current Treatment:   Intrarosa - caused irritation after 2nd dose stopped      Vasomotor symptoms   Frequency: None controlled with Divigel    Libido  Wants to increase libido, wants back her sex drive. Fluoxetine has made it difficult to orgasm.   Medications that may lower libido:   Fluoxetine  Previous Treatment:   None  Recommended Treatment:   Addyi  No results found for: \"TESTOSTERONE\"  Lab Results   Component Value Date    HGB 13.4 10/23/2024       Urinary Incontinence  Experiences urgency, occasional leakage if doesn't get to the bathroom in time. About once monthly.      Cardiovascular Health  The ASCVD Risk score (Brule DK, et al., 2019) failed to calculate for the following reasons:    Cannot find a previous HDL lab    Cannot find a previous total cholesterol lab    Lab Results   Component Value Date    CHOL 196 09/17/2019     Lab Results   Component Value Date    HDL 48.3 09/17/2019     No results found for: \"LDLCALC\"  Lab Results   Component Value Date    TRIG 126 09/17/2019     No components found for: \"CHOLHDL\"   BP Readings from Last 3 Encounters:   08/13/24 123/83   08/01/24 124/78   01/27/24 110/69      Blood Sugar Balance  Lab Results   Component Value Date    GLUCOSE 135 (H) 06/01/2023    HGBA1C 5.4 04/27/2023     No results " "found for: \"LEPTIN\", \"INSULFAST\", \"GLUF\"    Thyroid  Lab Results   Component Value Date    TSH 1.91 10/23/2024         Iron Status  Lab Results   Component Value Date    IRON 119 10/23/2024    TIBC 328 10/23/2024    FERRITIN 113 10/23/2024        Kidney Function  Lab Results   Component Value Date    GFRF 84 06/01/2023    CREATININE 0.86 06/01/2023       Potassium  Lab Results   Component Value Date    K 4.9 06/01/2023        Vitamin D3  Lab Results   Component Value Date    VITD25 33 10/23/2024       Current Outpatient Medications on File Prior to Visit   Medication Sig Dispense Refill    ALPRAZolam (Xanax) 1 mg tablet TAKE ONE TABLET BY MOUTH DAILY AS NEEDED FOR INSOMNIA      bisacodyl (Dulcolax) 5 mg EC tablet Take 1 tablet (5 mg) by mouth if needed for constipation.      estradiol (Estrace) 0.01 % (0.1 mg/gram) vaginal cream Discard the applicator and apply a pea sized amount to the vaginal opening and just inside the vagina daily for 14 days followed by 3 times/week 42.5 g 1    estradioL 1.25 mg/1.25 gram (0.1 %) gel in packet Place 1.25 mg on the skin once daily. 1.25 g 11    flibanserin (Addyi) 100 mg tablet Take 100 mg by mouth once daily. Take at bedtime 30 tablet 11    FLUoxetine (PROzac) 40 mg capsule Take 1 capsule (40 mg) by mouth early in the morning..      fluticasone (Flonase) 50 mcg/actuation nasal spray Administer 2 sprays into each nostril if needed.      lactulose 20 gram/30 mL oral solution Take 30 mL (20 g) by mouth 4 times a day as needed (constipation). 3600 mL 5    loratadine (Claritin) 10 mg tablet Take 1 tablet (10 mg) by mouth if needed.      MELATONIN ORAL Take by mouth as needed at bedtime.      polyethylene glycol (GoLYTELY) 236-22.74-6.74 -5.86 gram solution Drink 1/2 starting at 6 pm the night before your procedure then drink the 2nd 1/2 5 hours before procedure arrival time 4000 mL 0    prasterone, dhea, (Intrarosa) 6.5 mg insert Insert 6.5 mg into the vagina once daily at bedtime. " 28 each 11     No current facility-administered medications on file prior to visit.        Medication and allergy reconciliation completed     Drug Interactions   No significant drug interactions identified    Assessment/Plan   Patient has stopped intrarosa and vaginal estradiol due to vaginal irritation, will talk with Mitesh to see if a compounded product may be an option.   Using divigel for VMS, working well. Symptoms controlled. No side effects.   Addyi  Patient is experiencing hypoactive sexual disorder (HSDD).   Not due to medical or mental health problems, problems in the relationship, or medicine or other drug use.   Patient still has ovaries, she is not post menopausal, she had a hysterectomy last year at age 48.   Take at bedtime, with or without food, avoid alcohol  Your risk of severe low blood pressure and fainting is increased if you take Addyi and drink alcohol close in time to when you take your Addyi dose or take certain prescription medications, other the counter medications (cimetidine), or herbal supplements (St. Johns Wort, ginkgo, kava kava, valerian, or resveratrol), or have liver problems.   Please let me know if you are going to start any new medications or supplements so that I can check for interactions.   Wait at least 2 hours after drinking 1 or 2 standard alcoholic drinks before taking Addyi at bedtime.   Standard drink is 12 oz of beer, 5 oz of wine, 1.5 oz distilled spirits  If drinking 3+ standard beverages in the evening skip your Addyi dose at bedtime.   After taking Addyi at bedtime do not drink alcohol until the following day.   Do not drive, operate machinery, or do things that require clear thinking until at least 6 hours after your Addyi and until you know how Addyi affects you.   Avoid grapefruit juice while on this medication, increases risk of fainting.   Benefits: In clinical trials women noticed more sexual desire, more satisfying sexual events, and less frustration  "associated with libido.   Improvements may be seen in as early as 4 weeks, medication will be discontinued if no improvement after 12 weeks.   Side effects: common side effects include weight loss, dizziness (11%), drowsiness (10%), nausea (10%),  difficulty falling asleep or staying asleep (5%), and dry mouth, anxiety, constipation (2%).   Hypersensitivity reaction is possible, stop the medication and seek immediate medical attention if severe.    Patient is interested in weight loss  Will schedule a visit to discuss.     MONITORING  No results found for: \"SBP\", \"DBP\"    LABS  Hepatic panel     START  Addyi 100mg, start with 1/2 tablet daily for 2 weeks, then increase to 1 tablet nightly at bedtime. Do not drink alcohol or grapefruit juice.     CONTINUE  Divigel 1.25mg/1.25 gram applied nightly    Follow-up: 1/28/25 1:40pm     Time spent with pt: Total length of time 50 (minutes) of the encounter and more than 50% was spent counseling the patient.      Liset Burns, Pharm.D, [facility], [facility], [facility]  Clinical Pharmacist  Pharmacy Services  [phone]    Continue all meds under the continuation of care with the referring provider and clinical pharmacy team.    Verbal consent to manage patient's drug therapy was obtained from the patient and/or an individual authorized to act on behalf of a patient. They were informed they may decline to participate or withdraw from participation in pharmacy services at any time.  "

## 2024-12-22 NOTE — PROGRESS NOTES
Division of Minimally Invasive Gynecologic Surgery  [facility]    Date: 12/24/24 - Gynecology Visit - Virtual Telephone Visit     Video location: Home    Virtual or Telephone Consent    A telephone visit (audio only) between the patient (at the originating site) and the provider (at the distant site) was utilized to provide this telehealth service.   Verbal consent was requested and obtained from Miriam Torres on this date, 12/31/24 for a telehealth visit.     CC: Imaging review     Miriam Torres is a 49 y.o. perimenopausal pt with path-proven endometriosis presents for review of recent MRI. Last visit 8/1/2024. At that time, she was given information on yoga for pelvic pain but was overall doing well. MRI ordered for endometriosis surveillance.     She has tried progesterone suppression in the past, but does not feel well on progesterones. Still using estrogen cream, planning to start Addyi.     She is struggling w/ migraines w/ vertigo. Unsure if these are cyclic, thinks they are more random. Planning to start tracking and suspects they may be stress related. No relationship to when she started estrogen.     Pain control has been overall quite good. Occasional bilateral cramping, but very manageable.     Imaging:   - MRI pelvis 12/5/24 was notable for R adnexal cyst measuring 4cm x 4cm x 4cm, thought to be physiologic. Of note, similar lesion was seen on MRI 8/2024, though at this time it measured 3cm x 3cm x 3cm.   Labs:  - BMP 6/2023 WNL   - A1C 5.4% 4/2023   - TSH WNL 10/2024  - CBC WNL 10/2024   Screening:  - Last colonoscopy: 12/2024 notable only for hemorrhoids   - Last mammogram: 9/2023 BIRADS 1   - Last pap: s/p TRH 6/2023, no indication for further paps   PMHx: Lichen sclerosis (follows w/ Mitesh Guerrero), mixed IBS  PSHx: scalp cyst removal, TRH-BS/excision of endometriosis/bilateral ovarian cystectomy 6/2023     PMHx, PSHx, SHx, Allergies, and Medications updated in  Epic.    ROS: reviewed and negative    A/P: Miriam Torres is a 49 y.o. perimenopausal pt with path-proven endometriosis presents for review of recent MRI. Last visit 8/1/2024. At that time, she was given information on yoga for pelvic pain but was overall doing well. MRI ordered for endometriosis surveillance.   - Neurology referral for migraines w/ vertigo  - Tumor markers for ovarian cyst  - Repeat pelvic US in 6 months     Michelle Leonard MD  Division of Minimally Invasive Gynecologic Surgery  [facility]

## 2024-12-23 ENCOUNTER — APPOINTMENT (OUTPATIENT)
Dept: PHARMACY | Facility: HOSPITAL | Age: 49
End: 2024-12-23
Payer: COMMERCIAL

## 2024-12-23 DIAGNOSIS — F52.0 HYPOACTIVE SEXUAL DESIRE DISORDER: ICD-10-CM

## 2024-12-23 LAB
LABORATORY COMMENT REPORT: NORMAL
PATH REPORT.FINAL DX SPEC: NORMAL
PATH REPORT.GROSS SPEC: NORMAL
PATH REPORT.RELEVANT HX SPEC: NORMAL
PATH REPORT.TOTAL CANCER: NORMAL

## 2024-12-23 PROCEDURE — RXMED WILLOW AMBULATORY MEDICATION CHARGE

## 2024-12-23 RX ORDER — FLIBANSERIN 100 MG/1
100 TABLET, FILM COATED ORAL DAILY
Qty: 90 TABLET | Refills: 3 | Status: SHIPPED | OUTPATIENT
Start: 2024-12-23

## 2024-12-23 NOTE — Clinical Note
Jared Sagastume, sorry for so many messages today! :) Miriam is having vaginal irritation with both estradiol vaginal cream as well as prasterone - wondering if you'd like to call in a compounded vaginal product for her so that she might have a more hypoallergenic base. She also asked if she could work with me on weight loss, is that ok with you if I add it to her referral?   Liset Rand

## 2024-12-24 ENCOUNTER — TELEMEDICINE (OUTPATIENT)
Dept: OBSTETRICS AND GYNECOLOGY | Facility: CLINIC | Age: 49
End: 2024-12-24
Payer: COMMERCIAL

## 2024-12-24 DIAGNOSIS — N83.201 RIGHT OVARIAN CYST: ICD-10-CM

## 2024-12-24 DIAGNOSIS — N80.9 ENDOMETRIOSIS: Primary | ICD-10-CM

## 2024-12-24 DIAGNOSIS — G43.809 OTHER MIGRAINE WITHOUT STATUS MIGRAINOSUS, NOT INTRACTABLE: ICD-10-CM

## 2024-12-24 PROCEDURE — 99213 OFFICE O/P EST LOW 20 MIN: CPT | Performed by: STUDENT IN AN ORGANIZED HEALTH CARE EDUCATION/TRAINING PROGRAM

## 2024-12-26 ENCOUNTER — PHARMACY VISIT (OUTPATIENT)
Dept: PHARMACY | Facility: CLINIC | Age: 49
End: 2024-12-26
Payer: COMMERCIAL

## 2024-12-30 ENCOUNTER — LAB (OUTPATIENT)
Dept: LAB | Facility: LAB | Age: 49
End: 2024-12-30
Payer: COMMERCIAL

## 2024-12-30 DIAGNOSIS — F52.0 HYPOACTIVE SEXUAL DESIRE DISORDER: ICD-10-CM

## 2024-12-30 DIAGNOSIS — N83.201 RIGHT OVARIAN CYST: ICD-10-CM

## 2024-12-30 LAB
ALBUMIN SERPL BCP-MCNC: 4.2 G/DL (ref 3.4–5)
ALP SERPL-CCNC: 97 U/L (ref 33–110)
ALT SERPL W P-5'-P-CCNC: 15 U/L (ref 7–45)
AST SERPL W P-5'-P-CCNC: 14 U/L (ref 9–39)
BILIRUB DIRECT SERPL-MCNC: 0.1 MG/DL (ref 0–0.3)
BILIRUB SERPL-MCNC: 0.3 MG/DL (ref 0–1.2)
CANCER AG125 SERPL-ACNC: 6.1 U/ML (ref 0–30.2)
CANCER AG19-9 SERPL-ACNC: 6.48 U/ML
CEA SERPL-MCNC: 1.6 UG/L
FSH SERPL-ACNC: 3.3 IU/L
PROT SERPL-MCNC: 6.8 G/DL (ref 6.4–8.2)

## 2024-12-30 PROCEDURE — 82378 CARCINOEMBRYONIC ANTIGEN: CPT

## 2024-12-30 PROCEDURE — 83001 ASSAY OF GONADOTROPIN (FSH): CPT

## 2024-12-30 PROCEDURE — 86301 IMMUNOASSAY TUMOR CA 19-9: CPT

## 2024-12-30 PROCEDURE — 80076 HEPATIC FUNCTION PANEL: CPT

## 2024-12-30 PROCEDURE — 86304 IMMUNOASSAY TUMOR CA 125: CPT

## 2024-12-31 ENCOUNTER — OFFICE VISIT (OUTPATIENT)
Dept: URGENT CARE | Age: 49
End: 2024-12-31
Payer: COMMERCIAL

## 2024-12-31 VITALS
OXYGEN SATURATION: 97 % | TEMPERATURE: 98.1 F | RESPIRATION RATE: 18 BRPM | HEART RATE: 71 BPM | DIASTOLIC BLOOD PRESSURE: 86 MMHG | SYSTOLIC BLOOD PRESSURE: 131 MMHG

## 2024-12-31 DIAGNOSIS — R05.9 COUGH, UNSPECIFIED TYPE: ICD-10-CM

## 2024-12-31 DIAGNOSIS — J01.10 ACUTE FRONTAL SINUSITIS, RECURRENCE NOT SPECIFIED: Primary | ICD-10-CM

## 2024-12-31 LAB
POC RAPID INFLUENZA A: NEGATIVE
POC RAPID INFLUENZA B: NEGATIVE
POC SARS-COV-2 AG BINAX: NORMAL

## 2024-12-31 PROCEDURE — 87804 INFLUENZA ASSAY W/OPTIC: CPT | Performed by: NURSE PRACTITIONER

## 2024-12-31 PROCEDURE — 99213 OFFICE O/P EST LOW 20 MIN: CPT | Performed by: NURSE PRACTITIONER

## 2024-12-31 PROCEDURE — 87811 SARS-COV-2 COVID19 W/OPTIC: CPT | Performed by: NURSE PRACTITIONER

## 2024-12-31 RX ORDER — DOXYCYCLINE HYCLATE 100 MG
100 TABLET ORAL 2 TIMES DAILY
Qty: 20 TABLET | Refills: 0 | Status: SHIPPED | OUTPATIENT
Start: 2024-12-31 | End: 2025-01-10

## 2024-12-31 RX ORDER — FLUTICASONE PROPIONATE 50 MCG
2 SPRAY, SUSPENSION (ML) NASAL AS NEEDED
Qty: 16 G | Refills: 0 | Status: SHIPPED | OUTPATIENT
Start: 2024-12-31

## 2024-12-31 ASSESSMENT — ENCOUNTER SYMPTOMS
SHORTNESS OF BREATH: 0
VERTIGO: 0
SORE THROAT: 0
SWOLLEN GLANDS: 0
NAUSEA: 0
HOARSE VOICE: 1
CHILLS: 0
VOMITING: 0
COUGH: 1
HEADACHES: 1
WHEEZING: 0
FEVER: 0
SNORING: 0
RHINORRHEA: 1
FATIGUE: 0

## 2024-12-31 NOTE — LETTER
December 31, 2024     Patient: Miriam Torres   YOB: 1975   Date of Visit: 12/31/2024       To Whom It May Concern:    Miriam Torres was seen in my clinic on 12/31/2024 at 3:15 pm. Please excuse Miriam for her absence from work on this day to make the appointment.    If you have any questions or concerns, please don't hesitate to call.         Sincerely,         ELÍAS Kimball-CNP        CC: No Recipients

## 2024-12-31 NOTE — PROGRESS NOTES
Subjective   Patient ID: Miriam Torres is a 49 y.o. female. They present today with a chief complaint of Sinusitis and Cough (X 3 days).    History of Present Illness    History provided by:  Patient   used: No    Sinusitis  Pain details:     Location:  Frontal    Quality:  Pressure    Severity:  Moderate    Duration:  7 days    Timing:  Constant  Progression:  Worsening  Chronicity:  New  Context: allergies    Relieved by: Sudafed.  Worsened by:  Nothing  Ineffective treatments:  None tried  Associated symptoms: congestion, cough, headaches, hoarse voice and rhinorrhea    Associated symptoms: no chest pain, no chills, no ear pain, no fatigue, no fever, no mouth breathing, no nausea, no shortness of breath, no sneezing, no snoring, no sore throat, no swollen glands, no tooth pain, no vertigo, no vomiting and no wheezing    Cough  Associated symptoms include headaches and rhinorrhea. Pertinent negatives include no chest pain, chills, ear pain, fever, sore throat, shortness of breath or wheezing.       Past Medical History  Allergies as of 12/31/2024 - Reviewed 12/31/2024   Allergen Reaction Noted    Metronidazole Rash 02/19/2024       (Not in a hospital admission)       Past Medical History:   Diagnosis Date    Anxiety disorder, unspecified 09/05/2019    Anxiety       No past surgical history on file.     reports that she has never smoked. She has never used smokeless tobacco.    Review of Systems  Review of Systems   Constitutional:  Negative for chills, fatigue and fever.   HENT:  Positive for congestion, hoarse voice and rhinorrhea. Negative for ear pain, sneezing and sore throat.    Respiratory:  Positive for cough. Negative for snoring, shortness of breath and wheezing.    Cardiovascular:  Negative for chest pain.   Gastrointestinal:  Negative for nausea and vomiting.   Neurological:  Positive for headaches. Negative for vertigo.                                  Objective    Vitals:     12/31/24 1520   BP: 131/86   Pulse: 71   Resp: 18   Temp: 36.7 °C (98.1 °F)   SpO2: 97%     Patient's last menstrual period was 04/03/2023.    Physical Exam  Vitals and nursing note reviewed.   Constitutional:       Appearance: Normal appearance.   HENT:      Head: Normocephalic and atraumatic.      Right Ear: Hearing, tympanic membrane, ear canal and external ear normal.      Left Ear: Hearing, tympanic membrane, ear canal and external ear normal.      Nose: Nasal tenderness, mucosal edema, congestion and rhinorrhea present. No nasal deformity, septal deviation, signs of injury or laceration. Rhinorrhea is purulent.      Right Sinus: Frontal sinus tenderness present. No maxillary sinus tenderness.      Left Sinus: Frontal sinus tenderness present. No maxillary sinus tenderness.      Mouth/Throat:      Lips: Pink.      Mouth: Mucous membranes are moist.      Pharynx: Uvula midline.      Tonsils: No tonsillar exudate or tonsillar abscesses.   Cardiovascular:      Rate and Rhythm: Normal rate and regular rhythm.      Heart sounds: Normal heart sounds.   Pulmonary:      Effort: Pulmonary effort is normal.      Breath sounds: Normal breath sounds and air entry.   Musculoskeletal:      Cervical back: Normal range of motion and neck supple.   Lymphadenopathy:      Cervical: No cervical adenopathy.   Neurological:      Mental Status: She is alert.   Psychiatric:         Mood and Affect: Mood normal.         Behavior: Behavior normal.         Procedures    Point of Care Test & Imaging Results from this visit  Results for orders placed or performed in visit on 12/31/24   POCT Covid-19 Rapid Antigen   Result Value Ref Range    POC YVETTE-COV-2 AG  Presumptive negative test for SARS-CoV-2 (no antigen detected)     Presumptive negative test for SARS-CoV-2 (no antigen detected)   POCT Influenza A/B manually resulted   Result Value Ref Range    POC Rapid Influenza A Negative Negative    POC Rapid Influenza B Negative Negative       No results found.    Diagnostic study results (if any) were reviewed by CASTRO Kimball.    Assessment/Plan   Allergies, medications, history, and pertinent labs/EKGs/Imaging reviewed by CASTRO Kimball.     Medical Decision Making  Take the antibiotic with food.  Eat yogurt or take probiotic once a day.  Symptoms should improve in 2 to 3 days.   Wash your hands often, especially after coughing or touching your nose or mouth.  Use disposable tissues instead of handkerchiefs.  Increase fluid intake and rest as needed.  Take Tylenol and/or ibuprofen as needed for aches and pain and/or fever.  Return or follow-up with primary care provider if symptoms did not improve.  Call 911 or go to the nearest emergency room if symptoms became severe such as fever of 102.5 degrees Fahrenheit or 39.2 degrees Celsius, severe pain, shortness of breath, chest tightness.   Patient verbalized understanding of the instructions and left in stable condition.      Orders and Diagnoses  Diagnoses and all orders for this visit:  Acute frontal sinusitis, recurrence not specified  -     doxycycline (Vibra-Tabs) 100 mg tablet; Take 1 tablet (100 mg) by mouth 2 times a day for 10 days. Take with a full glass of water and do not lie down for at least 30 minutes after.  -     fluticasone (Flonase) 50 mcg/actuation nasal spray; Administer 2 sprays into each nostril if needed for allergies or rhinitis.  Cough, unspecified type  -     POCT Covid-19 Rapid Antigen  -     POCT Influenza A/B manually resulted      Medical Admin Record      Patient disposition: Home    Electronically signed by CASTRO Kimball  3:49 PM

## 2025-01-02 ENCOUNTER — TELEPHONE (OUTPATIENT)
Dept: URGENT CARE | Age: 50
End: 2025-01-02

## 2025-01-02 DIAGNOSIS — J01.10 ACUTE FRONTAL SINUSITIS, RECURRENCE NOT SPECIFIED: Primary | ICD-10-CM

## 2025-01-02 RX ORDER — FLUCONAZOLE 150 MG/1
150 TABLET ORAL SEE ADMIN INSTRUCTIONS
Qty: 2 TABLET | Refills: 0 | Status: SHIPPED | OUTPATIENT
Start: 2025-01-02 | End: 2025-01-03

## 2025-01-27 NOTE — PROGRESS NOTES
Patient ID: Miriam Torres is a 49 y.o. female who presents for No chief complaint on file..    Referring Provider: Mitesh Guerrero  Pt was referred for Addyi     Preferred Pharmacy:    [id number] - [place], [place] - [address]  [address]  [facility] [address]  Phone: [phone] Fax: [phone]    [facility] - [place], [place] - [address]  [address] [facility] [address]  Phone: [phone] Fax: [phone]    [facility] - [place], [place] - [address]  [address]  [address]  [facility] [address]  Phone: [phone] Fax: [phone]    [facility] - [place], [place] - [address]  [address]  [facility] [address]  Phone: [phone] Fax: [phone]    [facility]  [address], [address]  [facility] [address]  Phone: [phone] Fax: [phone]    [address]  [address] [facility]  [facility] [address]  Phone: [phone] Fax: [phone]      Subjective    Menopausal age: Hysterectomy at age 48    Ovaries: both remain      Any Contraindications and/or Cautions to HT:   PH/FH breast cancer: No  PH/FH of ovarian cancer: No  PH/FH of uterine cancer: No  PH/FH of colon cancer: No  PH/FH of pancreatic cancer: No  PH/FH Dementia: Maternal grandmother  Stroke, MI, VTE, clotting disorder, or congenital heart disease: No  Systemic lupus erythematosus (increased clot risk): No  H/o Gallbladder disease? No  Risk of cholelithiasis, cholecystitis, and cholecystectomy, less with transdermal because it bypasses first past metabolism.     Osteopenia or osteoporosis: No    Stress Level (rate 1-10): 9 out of 10   Recommend daily 2-5 minutes of mindfulness, meditation, journaling, etc to help reduce cortisol levels.     Depression? Yes - currently treated with fluoxetine    Anxiety? Yes- currently treated with alprazolam    GI issues?  IBS-C since age 13 (endometriosis)  Frequency of BM? Requires laxative to have a BM   -Colonoscopy on Dec 13th   - Next BM 18-19th (bisacodyl x 3)   -Recommend magnesium citrate    -currently taking magnesium 5-6 capsules   Migraine? Yes, current, along with vertigo    Vaginal Symptoms  Dyspareunia (pain before/during/after intercourse): N/A  Vaginal Bleeding: No  Vaginal Dryness: No  Dysuria (pain, burning, stinging, or itching with urination): No  Vaginal and/or vulvar irritation/itching: Yes, lichen sclerosis, using clobetasol 2 nights/week  Recurrent urinary tract infections: No  Recurrent yeast infections: Yes, remote past (early 20's constant)    Lab Results   Component Value Date    FSH 3.3 12/30/2024       Non Pharm Mgmt:   Silicone based - caused irritation after using  Discussed uber lube and good clean love as alternative options  Previous Treatment:   Vaginal estradiol (estrace) - caused irritation  Current Treatment:   Intrarosa - caused irritation after 2nd dose stopped      Vasomotor symptoms   Frequency: None controlled with Divigel    Libido  Wants to increase libido, wants back her sex drive. Fluoxetine has made it difficult to orgasm.   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 Use a tracker for this such as oura, fit bit, whoop, etc.  Strength training? 3 days/week, each day focuses on region  Recommend 2-3x/week for 20 minutes  Consider listening to podcast interviews with Jazz Fajardo  Current dietary pattern (if following a specific pattern of eating):    None    Toxins:  Tobacco? No  Alcohol? Yes, 2-4 drinks/week  Reducing to no more than 1 drink/week  THC/CBD? No  Heating food in plastic? Yes  Plastic water bottles? Occasionally    History of Gout? No, rarely to the below  BP Readings from Last  "3 Encounters:   12/31/24 131/86   08/13/24 123/83   08/01/24 124/78     No results found for: \"URICACID\"    If yes or high fructose diet (soda, hfcs, agave, fruit juice, etc.) or high animal based protein diet order uric acid level.   Uric acid levels above 5.5 mg/dL are linked to increased risk of metabolic diseases.   Fructose metabolism generates uric acid, signaling to the body to increase storage of fat.   Uric acid decreases nitric oxide, constricting blood vessels and impairing insulin function.   High sodium intake can increase uric acid by signaling dehydration, but staying hydrated dilutes sodiums effect.   Elevated levels of uric acid increase hunger and impulsivity.     Medications potentially contributing to weight gain:   Antidepressants   Medications tried/stopped for weight management:    None       Weight Loss Drug Therapy Options Screening:     Naltrexone and Bupropion  Binge eating: Yes  Depression:  Yes  PMH of Suicidal Ideation: No  Avoid use in patients with:  Uncontrolled hypertension: no  Seizure disorder: no  Eating disorder: no  Use of other bupropion-containing products: no  Chronic opioid use: no  Avoid use if on opioids. Patients treated with naltrexone may respond to lower opioid doses than previously used. This could result in potentially life-threatening opioid intoxication. Warn patients that any attempt to overcome opioid blockade during naltrexone therapy is dangerous and could potentially lead to fatal opioid overdose.  Within 14 days of monoamine oxidase inhibitors: no    GLP-1 and Metformin: No    Pre-Diabetes/Diabetes?   Lab Results   Component Value Date    HGBA1C 5.4 04/27/2023     No results found for: \"HSCRP\"    Pertinent PMH Review:   PMH of Pancreatitis: no  PMH of Gallbladder disease: no  PMH of Delayed Gastric Emptying: no  GI issues? Constipation  Frequency of BM? Has to take laxatives to go 1-2x/week  PMH of MTC:  No  PMH of Retinopathy: No    Topiramate:   Migraines? " "Yes- frequently with vertigo    Adipex:   Anxiety? Yes    Thyroid health:   Lab Results   Component Value Date    TSH 1.91 10/23/2024        MHT  Lab Results   Component Value Date    FSH 3.3 12/30/2024     Any Contraindications and/or Cautions to HT:  Currently taking    Concurrent Chronic Medical Conditions    Cardiovascular Health  The ASCVD Risk score (Salina VELASQUEZ, et al., 2019) failed to calculate for the following reasons:    Cannot find a previous HDL lab    Cannot find a previous total cholesterol lab  Lab Results   Component Value Date    CHOL 196 09/17/2019     Lab Results   Component Value Date    HDL 48.3 09/17/2019     Lab Results   Component Value Date    TRIG 126 09/17/2019        No results found for: \"SBP\", \"DBP\"    Iron/B12/Folate Status  Lab Results   Component Value Date    IRON 119 10/23/2024    TIBC 328 10/23/2024    FERRITIN 113 10/23/2024      Lab Results   Component Value Date    YXUSUHVY50 393 09/17/2019       Liver Function  Lab Results   Component Value Date    AST 14 12/30/2024    ALT 15 12/30/2024        Kidney Function  Lab Results   Component Value Date    GFRF 84 06/01/2023    CREATININE 0.86 06/01/2023       Vitamin D3  Lab Results   Component Value Date    VITD25 33 10/23/2024       Current Outpatient Medications on File Prior to Visit   Medication Sig Dispense Refill    ALPRAZolam (Xanax) 1 mg tablet TAKE ONE TABLET BY MOUTH DAILY AS NEEDED FOR INSOMNIA      bisacodyl (Dulcolax) 5 mg EC tablet Take 1 tablet (5 mg) by mouth if needed for constipation.      estradioL 1.25 mg/1.25 gram (0.1 %) gel in packet APPLY 1 PACKET ON THE SKIN ONCE DAILY 112.5 g 3    FLUoxetine (PROzac) 40 mg capsule Take 1 capsule (40 mg) by mouth early in the morning..      fluticasone (Flonase) 50 mcg/actuation nasal spray Administer 2 sprays into each nostril if needed for allergies or rhinitis. 16 g 0    loratadine (Claritin) 10 mg tablet Take 1 tablet (10 mg) by mouth if needed.      MELATONIN ORAL Take by " mouth as needed at bedtime.      [DISCONTINUED] flibanserin (Addyi) 100 mg tablet Take 100 mg by mouth once daily. Take at bedtime 90 tablet 3     No current facility-administered medications on file prior to visit.        Lifestyle and Nourishment Recommendations (please review following our appointment)  Focus on whole foods as close to nature as possible (less than 5 ingredients on the label)  The order of eating matters during each meal, in order to minimize blood sugar spikes  First, 1/3 to 1/2 of meal as colorful vegetables eaten first  Increase fiber 25-30 grams daily (work up slowly to avoid GI distress)  Goal: Daily bowel movement  Second, eat protein and fat as part of your meal  Increase high quality protein to 25-30 grams per meal along with 2-3 x/week resistance training  May consider 3g/day of Creatine (CreaPure) daily mixed in water to support mood, cognition, muscle mass, and bone mass.    Third, have complex carbohydrates/starches as part of your meal  Consider drinking 1 tbsp of apple cider vinegar in a tall glass of water or as a salad dressing with extra virgin olive oil up to 20 minutes prior to or during a meal.   Helps to reduce blood sugar spikes from complex carbohydrates by up to 30%.   Beverages: Focus on filtered water, organic tea (loose leave or in paper, avoid plastic sachets), or sparkling/mineral water (ex. [place], [place])   No more than 1 cup (8oz) of organic coffee daily prior to noon. May consider organic green tea.   Avoid alcohol   Avoid ALL artificial sweeteners   Focus on building muscle- muscle is metabolic tissue  Strength training 2-3 times weekly for at least 20 minutes, max out at 8 reps, rest, repeat x 3.   Walk or move for 10 minutes after each meal - ideally outdoors  Goal: 7,000 steps per day, 7 days per week. Use a tracker for this such as oura, fit bit, whoop, etc.   Manage stress: minimum of 2-5 minutes daily for meditation, minfulness, etc. to reduce cortisol  levels.  Prioritize 7-9 hours of restful sleep nightly.   Turn off electronics 1 hour prior to bedtime, no electronics in the bedroom.  Establish a regular sleep/wake time (+/- 30 minutes)     Medication and allergy reconciliation completed     Drug Interactions   No significant drug interactions identified    Assessment/Plan     Patients goals:   Would like to loose 20-25 lbs  Discussed patients history, current medical conditions, medications, as well as current diet and lifestyle in depth.   Patient has been making diet/lifestyle changes for:   Current weight: 173  BMI: 31  Desired weight: 150 lbs  Concurrent medical conditions: Chronic constipation (going 1-2x/week with laxatives), hyperlipidemia, migraine.   Weight loss  Based on above screening: recommend first trying topiramate   Topiramate   Patient is experiencing migraine headaches/presenting for weight loss management    Provided patient education and counseling:    Administration: Take without regard to meals. Administer the IR formulation in divided doses. It is not recommended to crush, break, or chew IR tablets due to bitter taste.   Avoid alcohol. Stay well hydrated.   Use caution: following a strict ketogenic diet may increase the risk of acidosis and/or kidney stones.   Potential side effects: CNS effects (drowsiness, dizziness, fatigue), weight loss, paresthesia, change in taste, decreased appetite     If you miss a dose, take missed dose as soon as you remember, unless it is less than 6 hours until your next dose, then just skip the missed dose and resume your usual schedule.   Less common/more severe adverse reactions: allergic reaction, suicidal thoughts, muscle pain or weakness, trouble seeing,walking, or speaking.   MHT  ADDYI  Patient is not comfortable with taking addyi.   Concern that she has vertigo anyway and fluoxetine may increase risk of dizziness/hypotension with addyi.   Divigel  Working well, no side effects  Patient still has  "ovaries, she is not post menopausal, she had a hysterectomy last year at age 48.   Patient has been experiencing problems with sleep over the past month and has noticed increased depressive symptoms.   Patient has tried progesterone in the past and \"didn't like it\" due to bloating, and didn't see benefits for sleep, was on for over a month.   Lifestyle:   Please review the above lifestyle and nourishment recommendations between now and our next appointment.   Please choose 1-3 items you are not currently doing to implement.   Consider utilizing Cronometer to track dietary intake for 3-5 days.   Consider utilizing a CGM for 14 days.   Blood sugar spikes are often followed by dips, which increase food cravings for the remainder of the day    MONITORING  No results found for: \"SBP\", \"DBP\"    START  Migraine prophylaxis and weight loss  Topiramate 25 mg/day at night for the first week (recommended dose: 100 mg/day, in 2 divided doses)     CONTINUE  Divigel 1.25mg/1.25 gram applied nightly    Follow-up: 3/13/25 1:40pm     Time spent with pt: Total length of time 40 (minutes) of the encounter and more than 50% was spent counseling the patient.      Liset Burns, Pharm.D, FAM, [facility], [facility]  Clinical Pharmacist  Pharmacy Services  [phone]    Continue all meds under the continuation of care with the referring provider and clinical pharmacy team.    Verbal consent to manage patient's drug therapy was obtained from the patient and/or an individual authorized to act on behalf of a patient. They were informed they may decline to participate or withdraw from participation in pharmacy services at any time.  "

## 2025-01-28 ENCOUNTER — APPOINTMENT (OUTPATIENT)
Dept: PHARMACY | Facility: HOSPITAL | Age: 50
End: 2025-01-28
Payer: COMMERCIAL

## 2025-01-28 DIAGNOSIS — G43.909 MIGRAINE WITHOUT STATUS MIGRAINOSUS, NOT INTRACTABLE, UNSPECIFIED MIGRAINE TYPE: ICD-10-CM

## 2025-01-28 DIAGNOSIS — F52.0 HYPOACTIVE SEXUAL DESIRE DISORDER: ICD-10-CM

## 2025-01-28 DIAGNOSIS — Z71.3 WEIGHT LOSS COUNSELING, ENCOUNTER FOR: ICD-10-CM

## 2025-01-28 DIAGNOSIS — R63.5 WEIGHT GAIN: Primary | ICD-10-CM

## 2025-01-28 PROCEDURE — RXMED WILLOW AMBULATORY MEDICATION CHARGE

## 2025-01-28 RX ORDER — TOPIRAMATE 25 MG/1
25 TABLET ORAL 2 TIMES DAILY
Qty: 60 TABLET | Refills: 11 | Status: SHIPPED | OUTPATIENT
Start: 2025-01-28 | End: 2026-01-28

## 2025-01-30 ENCOUNTER — PHARMACY VISIT (OUTPATIENT)
Dept: PHARMACY | Facility: CLINIC | Age: 50
End: 2025-01-30
Payer: COMMERCIAL

## 2025-02-10 ENCOUNTER — APPOINTMENT (OUTPATIENT)
Dept: OBSTETRICS AND GYNECOLOGY | Facility: CLINIC | Age: 50
End: 2025-02-10
Payer: COMMERCIAL

## 2025-02-10 VITALS
BODY MASS INDEX: 30.76 KG/M2 | SYSTOLIC BLOOD PRESSURE: 124 MMHG | DIASTOLIC BLOOD PRESSURE: 70 MMHG | WEIGHT: 173.6 LBS | HEIGHT: 63 IN

## 2025-02-10 DIAGNOSIS — Z01.419 WELL WOMAN EXAM WITH ROUTINE GYNECOLOGICAL EXAM: Primary | ICD-10-CM

## 2025-02-10 DIAGNOSIS — N95.1 MENOPAUSAL SYNDROME ON HORMONE REPLACEMENT THERAPY: ICD-10-CM

## 2025-02-10 DIAGNOSIS — Z12.4 CERVICAL CANCER SCREENING: ICD-10-CM

## 2025-02-10 DIAGNOSIS — F52.0 HYPOACTIVE SEXUAL DESIRE DISORDER: ICD-10-CM

## 2025-02-10 DIAGNOSIS — N95.1 INSOMNIA ASSOCIATED WITH MENOPAUSE: ICD-10-CM

## 2025-02-10 DIAGNOSIS — Z79.890 MENOPAUSAL SYNDROME ON HORMONE REPLACEMENT THERAPY: ICD-10-CM

## 2025-02-10 DIAGNOSIS — Z12.31 BREAST CANCER SCREENING BY MAMMOGRAM: ICD-10-CM

## 2025-02-10 PROCEDURE — 88175 CYTOPATH C/V AUTO FLUID REDO: CPT

## 2025-02-10 PROCEDURE — 3008F BODY MASS INDEX DOCD: CPT | Performed by: NURSE PRACTITIONER

## 2025-02-10 PROCEDURE — 1036F TOBACCO NON-USER: CPT | Performed by: NURSE PRACTITIONER

## 2025-02-10 PROCEDURE — 87624 HPV HI-RISK TYP POOLED RSLT: CPT

## 2025-02-10 PROCEDURE — 99396 PREV VISIT EST AGE 40-64: CPT | Performed by: NURSE PRACTITIONER

## 2025-02-10 RX ORDER — TESTOSTERONE 20.25 MG/1.25G
GEL TOPICAL
Qty: 75 G | Refills: 0 | Status: SHIPPED | OUTPATIENT
Start: 2025-02-10

## 2025-02-10 RX ORDER — AMITRIPTYLINE HYDROCHLORIDE 10 MG/1
10 TABLET, FILM COATED ORAL NIGHTLY
Qty: 30 TABLET | Refills: 2 | Status: SHIPPED | OUTPATIENT
Start: 2025-02-10 | End: 2026-02-10

## 2025-02-10 ASSESSMENT — ENCOUNTER SYMPTOMS
NEUROLOGICAL NEGATIVE: 0
EYES NEGATIVE: 0
MUSCULOSKELETAL NEGATIVE: 0
ENDOCRINE NEGATIVE: 0
RESPIRATORY NEGATIVE: 0
GASTROINTESTINAL NEGATIVE: 0
HEMATOLOGIC/LYMPHATIC NEGATIVE: 0
PSYCHIATRIC NEGATIVE: 0
CONSTITUTIONAL NEGATIVE: 0
CARDIOVASCULAR NEGATIVE: 0
ALLERGIC/IMMUNOLOGIC NEGATIVE: 0

## 2025-02-10 ASSESSMENT — PATIENT HEALTH QUESTIONNAIRE - PHQ9
1. LITTLE INTEREST OR PLEASURE IN DOING THINGS: NOT AT ALL
SUM OF ALL RESPONSES TO PHQ9 QUESTIONS 1 & 2: 0
2. FEELING DOWN, DEPRESSED OR HOPELESS: NOT AT ALL

## 2025-02-10 ASSESSMENT — PAIN SCALES - GENERAL: PAINLEVEL_OUTOF10: 0-NO PAIN

## 2025-02-10 NOTE — PROGRESS NOTES
"Subjective   Patient ID: Miriam Torres is a 49 y.o. female who presents for Annual Exam (Pap 2019/).  HPI  Pt presents for annual exam  Concerns:   H/o hysterectomy d/t endometriosis  Continues to have breast tenderness, normal breast imaging  Migraines in the afternoon  Possible LS, clobetasol 3 times/week   Working with Liset Jessicaalex for weight loss  Previous h/o AE MP  Previous tried testosterone but didn't notice improvement but would like to try again    Age at Menopause: unknown d/t hysterectomy    History of a DVT/PE: none  History of HTN: none  History of elevated cholesterol: LDL only  Tobacco use: none  Personal history of breast cancer: none    History of HT: estradiol 1.25mg gel  History of compounded bioidentical: none  History of OTC treatments for menopausal symptoms: melatonin for sleep   History of prescription, non-hormonal medications for menopausal symptoms: none      Mood changes: depression and anxiety \"easing up\"  Sleep problems: yes and doesn't feel rested in the morning upon waking  VMS: none  Joint pain: \"some\"  Brain fog/difficulty concentrating: yes  Lack of focus  Elevated cortisol  Constipation    GSM: yes, irritation, on vaginal estrogen cream   are you sexually active?: no d/t circumstances    Vaginal hygiene: soap-ivory  Urinary incontinence: yes, urge; previously went to [facility]    H/O pregnancy:   H/O of STI: HSV, no h/o outbreaks   requesting sti testing?: none  H/O abnormal pap: yes, HPV    Lives with: by herself  Employment: sales  exercise: none    Calcium intake: adequate  Vitamin D intake: adequate   Increased risk of osteoporosis: none  Fracture since menopause: none    FH of breast cancer: none  FH of ovarian cancer: none  FH of colon cancer:  none  FH pancreatic cancer:  none  Review of Systems    Objective   Physical Exam  Vitals and nursing note reviewed.   Constitutional:       Appearance: Normal appearance.   Cardiovascular:      Rate and Rhythm: Normal rate and regular " rhythm.      Heart sounds: Normal heart sounds.   Pulmonary:      Effort: Pulmonary effort is normal.      Breath sounds: Normal breath sounds.   Chest:   Breasts:     Right: Normal.      Left: Normal.   Abdominal:      Tenderness: There is no abdominal tenderness.   Genitourinary:     General: Normal vulva.      Exam position: Lithotomy position.      Labia:         Right: No lesion.         Left: No lesion.       Vagina: Normal.   Skin:     General: Skin is warm and dry.   Neurological:      Mental Status: She is alert.   Psychiatric:         Attention and Perception: Attention normal.         Mood and Affect: Mood normal.         Speech: Speech normal.         Behavior: Behavior normal.         Thought Content: Thought content normal.         Cognition and Memory: Cognition and memory normal.         Judgment: Judgment normal.         Assessment/Plan   Diagnoses and all orders for this visit:  Well woman exam with routine gynecological exam  Insomnia associated with menopause  -     amitriptyline (Elavil) 10 mg tablet; Take 1 tablet (10 mg) by mouth once daily at bedtime.  Menopausal syndrome on hormone replacement therapy  -     Testosterone,Free and Total; Future  Breast cancer screening by mammogram  -     BI mammo bilateral screening tomosynthesis; Future  Hypoactive sexual desire disorder  -     testosterone 20.25 mg/1.25 gram (1.62 %) gel in metered-dose pump; Apply one pump weekly to the back of your leg or inner thigh  Cervical cancer screening  -     THINPREP PAP TEST             ELÍAS Bautista-CNP 02/10/25 2:46 PM

## 2025-02-17 ENCOUNTER — TELEPHONE (OUTPATIENT)
Dept: OBSTETRICS AND GYNECOLOGY | Facility: CLINIC | Age: 50
End: 2025-02-17
Payer: COMMERCIAL

## 2025-02-17 NOTE — TELEPHONE ENCOUNTER
Called patient to discuss medication copay. Unfortunately, she is not eligible for  Patient Assistance Program due to income. She has not yet picked up estradiol or testosterone, she has about 4 days of medications remaining at home. [facility] will call pharmacies to reverse claim to see if cheaper through  pharmacy.     Addendum:   testosterone $63/month through   Divigel $70.79/month thru [facility]      Lilliana Ramirez, PharmD (covering for Liset Burns)

## 2025-02-17 NOTE — TELEPHONE ENCOUNTER
Called patient to discuss medication questions  Identified by name and   Wanted to look at best price available for testosterone., currently $85  Estradiol through [facility] expensive now, needs to see about cheaper options with this as well, currently $150  RN to forward message to provider to advise.  Patient verbalized understanding, all questions/concerns addressed at this time.    Shaylee Kwan, SHENN RN

## 2025-02-18 DIAGNOSIS — F52.0 HYPOACTIVE SEXUAL DESIRE DISORDER: Primary | ICD-10-CM

## 2025-02-19 ENCOUNTER — TELEPHONE (OUTPATIENT)
Dept: OBSTETRICS AND GYNECOLOGY | Facility: CLINIC | Age: 50
End: 2025-02-19
Payer: COMMERCIAL

## 2025-02-20 DIAGNOSIS — Z79.890 MENOPAUSAL SYNDROME ON HORMONE REPLACEMENT THERAPY: ICD-10-CM

## 2025-02-20 DIAGNOSIS — N95.1 MENOPAUSAL SYNDROME ON HORMONE REPLACEMENT THERAPY: ICD-10-CM

## 2025-02-21 RX ORDER — ESTRADIOL 1.25 MG/1.25G
GEL TOPICAL
Qty: 2 G | Refills: 0 | Status: SHIPPED | OUTPATIENT
Start: 2025-02-21

## 2025-02-24 LAB
CYTOLOGY CMNT CVX/VAG CYTO-IMP: NORMAL
HPV HR 12 DNA GENITAL QL NAA+PROBE: NEGATIVE
HPV HR GENOTYPES PNL CVX NAA+PROBE: NEGATIVE
HPV16 DNA SPEC QL NAA+PROBE: NEGATIVE
HPV18 DNA SPEC QL NAA+PROBE: NEGATIVE
LAB AP HPV GENOTYPE QUESTION: YES
LAB AP HPV HR: NORMAL
LAB AP PREVIOUS ABNORMAL HISTORY: NORMAL
LABORATORY COMMENT REPORT: NORMAL
MENSTRUAL HX REPORTED: NORMAL
PATH REPORT.TOTAL CANCER: NORMAL

## 2025-03-10 DIAGNOSIS — N95.1 MENOPAUSAL SYNDROME ON HORMONE REPLACEMENT THERAPY: ICD-10-CM

## 2025-03-10 DIAGNOSIS — Z79.890 MENOPAUSAL SYNDROME ON HORMONE REPLACEMENT THERAPY: ICD-10-CM

## 2025-03-12 NOTE — PROGRESS NOTES
Patient ID: Miriam Torres is a 49 y.o. female who presents for No chief complaint on file..    Referring Provider: Mitesh Guerrero  Pt was referred for Addyi     Preferred Pharmacy:    [id number] - [place], [place] - [address]  [address]  [facility] [address]  Phone: [phone] Fax: [phone]    [facility] - [place], [place] - [address]  [address] [facility] [address]  Phone: [phone] Fax: [phone]    [facility] - [place], [place] - [address]  [address]  [address]  [facility] [address]  Phone: [phone] Fax: [phone]    [facility] - [place], [place] - [address]  [address]  [facility] [address]  Phone: [phone] Fax: [phone]    [facility]  [address]  [facility] [address]  Phone: [phone] Fax: [phone]    [facility] [address]  [address] [facility] [address]  [facility] [address]  Phone: [phone] Fax: [phone]    [id number] - [place], [place] - [address]  [address]  [facility] [address]  Phone: [phone] Fax: [phone]    [facility] [address]  [address]  [address]  [facility] [address]  Phone: [phone] Fax: [phone]      Subjective    Menopausal age: Hysterectomy at age 48    Ovaries: both remain      Any Contraindications and/or Cautions to HT:   PH/FH breast cancer: No  PH/FH of ovarian cancer: No  PH/FH of uterine cancer: No  PH/FH of colon cancer: No  PH/FH of pancreatic cancer: No  PH/FH Dementia: Maternal grandmother  Stroke, MI, VTE, clotting disorder, or congenital heart disease: No  Systemic lupus erythematosus (increased clot risk): No  H/o Gallbladder disease? No  Risk of cholelithiasis, cholecystitis, and cholecystectomy, less with transdermal because it bypasses first past metabolism.     Osteopenia or osteoporosis:  No    Stress Level (rate 1-10): 9 out of 10   Recommend daily 2-5 minutes of mindfulness, meditation, journaling, etc to help reduce cortisol levels.     Depression? Yes - currently treated with fluoxetine    Anxiety? Yes- currently treated with alprazolam    GI issues? IBS-C since age 13 (endometriosis)  Frequency of BM? Requires laxative to have a BM   -Colonoscopy on Dec 13th   - Next BM 18-19th (bisacodyl x 3)   -Recommend magnesium citrate    -currently taking magnesium 5-6 capsules   Migraine? Yes, current, along with vertigo    Vaginal Symptoms  Dyspareunia (pain before/during/after intercourse): N/A  Vaginal Bleeding: No  Vaginal Dryness: No  Dysuria (pain, burning, stinging, or itching with urination): No  Vaginal and/or vulvar irritation/itching: Yes, lichen sclerosis, using clobetasol 2 nights/week  Recurrent urinary tract infections: No  Recurrent yeast infections: Yes, remote past (early 20's constant)    Lab Results   Component Value Date    FSH 3.3 12/30/2024       Non Pharm Mgmt:   Silicone based - caused irritation after using  Discussed uber lube and good clean love as alternative options  Previous Treatment:   Vaginal estradiol (estrace) - caused irritation  Current Treatment:   Intrarosa - caused irritation after 2nd dose stopped      Vasomotor symptoms   Frequency: None controlled with Divigel    Libido  Wants to increase libido, wants back her sex drive. Fluoxetine has made it difficult to orgasm.   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 Use a tracker for this such as oura, fit bit, whoop, etc.  Strength training? 3 days/week, each day focuses on region  Recommend 2-3x/week for 20 minutes  Consider listening to podcast interviews with Jazz Fajardo  Current dietary pattern (if following a  "specific pattern of eating):    None    Toxins:  Tobacco? No  Alcohol? Yes, 2-4 drinks/week  Reducing to no more than 1 drink/week  THC/CBD? No  Heating food in plastic? Yes  Plastic water bottles? Occasionally    History of Gout? No, rarely to the below  BP Readings from Last 3 Encounters:   02/10/25 124/70   12/31/24 131/86   08/13/24 123/83     No results found for: \"URICACID\"    If yes or high fructose diet (soda, hfcs, agave, fruit juice, etc.) or high animal based protein diet order uric acid level.   Uric acid levels above 5.5 mg/dL are linked to increased risk of metabolic diseases.   Fructose metabolism generates uric acid, signaling to the body to increase storage of fat.   Uric acid decreases nitric oxide, constricting blood vessels and impairing insulin function.   High sodium intake can increase uric acid by signaling dehydration, but staying hydrated dilutes sodiums effect.   Elevated levels of uric acid increase hunger and impulsivity.     Medications potentially contributing to weight gain:   Antidepressants   Medications tried/stopped for weight management:    None       Weight Loss Drug Therapy Options Screening:     Naltrexone and Bupropion  Binge eating: Yes  Depression:  Yes  PMH of Suicidal Ideation: No  Avoid use in patients with:  Uncontrolled hypertension: no  Seizure disorder: no  Eating disorder: no  Use of other bupropion-containing products: no  Chronic opioid use: no  Avoid use if on opioids. Patients treated with naltrexone may respond to lower opioid doses than previously used. This could result in potentially life-threatening opioid intoxication. Warn patients that any attempt to overcome opioid blockade during naltrexone therapy is dangerous and could potentially lead to fatal opioid overdose.  Within 14 days of monoamine oxidase inhibitors: no    GLP-1 and Metformin: No    Pre-Diabetes/Diabetes?   Lab Results   Component Value Date    HGBA1C 5.4 04/27/2023     No results found for: " "\"HSCRP\"    Pertinent PMH Review:   PMH of Pancreatitis: no  PMH of Gallbladder disease: no  PMH of Delayed Gastric Emptying: no  GI issues? Constipation  Frequency of BM? Has to take laxatives to go 1-2x/week  PMH of MTC:  No  PMH of Retinopathy: No    Topiramate:   Migraines? Yes- frequently with vertigo    Adipex:   Anxiety? Yes    Thyroid health:   Lab Results   Component Value Date    TSH 1.91 10/23/2024        MHT  Lab Results   Component Value Date    FSH 3.3 12/30/2024     Any Contraindications and/or Cautions to HT:  Currently taking    Concurrent Chronic Medical Conditions    Cardiovascular Health  The ASCVD Risk score (Salina VELASQUEZ, et al., 2019) failed to calculate for the following reasons:    Cannot find a previous HDL lab    Cannot find a previous total cholesterol lab  Lab Results   Component Value Date    CHOL 196 09/17/2019     Lab Results   Component Value Date    HDL 48.3 09/17/2019     Lab Results   Component Value Date    TRIG 126 09/17/2019        No results found for: \"SBP\", \"DBP\"    Iron/B12/Folate Status  Lab Results   Component Value Date    IRON 119 10/23/2024    TIBC 328 10/23/2024    FERRITIN 113 10/23/2024      Lab Results   Component Value Date    CNYMCQNA07 393 09/17/2019       Liver Function  Lab Results   Component Value Date    AST 14 12/30/2024    ALT 15 12/30/2024        Kidney Function  Lab Results   Component Value Date    GFRF 84 06/01/2023    CREATININE 0.86 06/01/2023       Vitamin D3  Lab Results   Component Value Date    VITD25 33 10/23/2024       Current Outpatient Medications on File Prior to Visit   Medication Sig Dispense Refill    ALPRAZolam (Xanax) 1 mg tablet TAKE ONE TABLET BY MOUTH DAILY AS NEEDED FOR INSOMNIA      amitriptyline (Elavil) 10 mg tablet Take 1 tablet (10 mg) by mouth once daily at bedtime. 30 tablet 2    bisacodyl (Dulcolax) 5 mg EC tablet Take 1 tablet (5 mg) by mouth if needed for constipation.      estradioL 1.25 mg/1.25 gram (0.1 %) gel in packet " APPLY 1 PACKET ON THE SKIN ONCE DAILY 2 g 0    FLUoxetine (PROzac) 40 mg capsule Take 1 capsule (40 mg) by mouth early in the morning..      fluticasone (Flonase) 50 mcg/actuation nasal spray Administer 2 sprays into each nostril if needed for allergies or rhinitis. 16 g 0    loratadine (Claritin) 10 mg tablet Take 1 tablet (10 mg) by mouth if needed.      MELATONIN ORAL Take by mouth as needed at bedtime.      pharmacy compounding accessory misc 2.5 mg once daily. 75 each 11    testosterone 20.25 mg/1.25 gram (1.62 %) gel in metered-dose pump Apply one pump weekly to the back of your leg or inner thigh 75 g 0    topiramate (Topamax) 25 mg tablet Take 1 tablet (25 mg) by mouth 2 times a day. 60 tablet 11     No current facility-administered medications on file prior to visit.        Lifestyle and Nourishment Recommendations (please review following our appointment)  Focus on whole foods as close to nature as possible (less than 5 ingredients on the label)  The order of eating matters during each meal, in order to minimize blood sugar spikes  First, 1/3 to 1/2 of meal as colorful vegetables eaten first  Increase fiber 25-30 grams daily (work up slowly to avoid GI distress)  Goal: Daily bowel movement  Second, eat protein and fat as part of your meal  Increase high quality protein to 25-30 grams per meal along with 2-3 x/week resistance training  May consider 3g/day of Creatine (CreaPure) daily mixed in water to support mood, cognition, muscle mass, and bone mass.    Third, have complex carbohydrates/starches as part of your meal  Consider drinking 1 tbsp of apple cider vinegar in a tall glass of water or as a salad dressing with extra virgin olive oil up to 20 minutes prior to or during a meal.   Helps to reduce blood sugar spikes from complex carbohydrates by up to 30%.   Beverages: Focus on filtered water, organic tea (loose leave or in paper, avoid plastic sachets), or sparkling/mineral water (ex. [place],  [place])   No more than 1 cup (8oz) of organic coffee daily prior to noon. May consider organic green tea.   Avoid alcohol   Avoid ALL artificial sweeteners   Focus on building muscle- muscle is metabolic tissue  Strength training 2-3 times weekly for at least 20 minutes, max out at 8 reps, rest, repeat x 3.   Walk or move for 10 minutes after each meal - ideally outdoors  Goal: 7,000 steps per day, 7 days per week. Use a tracker for this such as oura, fit bit, whoop, etc.   Manage stress: minimum of 2-5 minutes daily for meditation, minfulness, etc. to reduce cortisol levels.  Prioritize 7-9 hours of restful sleep nightly.   Turn off electronics 1 hour prior to bedtime, no electronics in the bedroom.  Establish a regular sleep/wake time (+/- 30 minutes)     Medication and allergy reconciliation completed     Drug Interactions   No significant drug interactions identified    Assessment/Plan     Patients goals:   Would like to loose 20-25 lbs  Discussed patients history, current medical conditions, medications, as well as current diet and lifestyle in depth.   Patient has been making diet/lifestyle changes for:   Current weight: 173  BMI: 31  Desired weight: 150 lbs  Concurrent medical conditions: Chronic constipation (going 1-2x/week with laxatives), hyperlipidemia, migraine.   Weight loss  Based on above screening: recommend first trying topiramate   She is experiencing emotional eating in addition to migraines  Topiramate   Started, but then patient didn't feel great the next day, she stopped, then restarted 2 days ago.   She is concerned about hair loss  Started in early 20's, took spironolactone and minoxidil for some time period but did not like spironolactone.   We talked about testosterone levels and hair loss  Ordering DHT to check metabolism of testosterone  Discussed GLP-1's, pricing and dosing  She is going to try topiramate for 1 more month then may consider switching  MHT  Divigel  Working well, no side  "effects  Patient still has ovaries, she is not post menopausal, she had a hysterectomy last year at age 48.   Patient has been experiencing problems with sleep over the past month and has noticed increased depressive symptoms.   Patient has tried progesterone in the past and \"didn't like it\" due to bloating, and didn't see benefits for sleep, was on for over a month.     MONITORING  No results found for: \"SBP\", \"DBP\"      CONTINUE  Migraine prophylaxis and weight loss  Topiramate 25 mg/day at night for the first week (recommended dose: 100 mg/day, in 2 divided doses)   Divigel 1.25mg/1.25 gram applied nightly    Follow-up: 4/17/25 1:40pm     Time spent with pt: Total length of time 30 (minutes) of the encounter and more than 50% was spent counseling the patient.      Liset Burns, Pharm.D, [facility], [facility], [facility]  Clinical Pharmacist  Pharmacy Services  [phone]    Continue all meds under the continuation of care with the referring provider and clinical pharmacy team.    Verbal consent to manage patient's drug therapy was obtained from the patient and/or an individual authorized to act on behalf of a patient. They were informed they may decline to participate or withdraw from participation in pharmacy services at any time.  "

## 2025-03-13 ENCOUNTER — APPOINTMENT (OUTPATIENT)
Dept: PHARMACY | Facility: HOSPITAL | Age: 50
End: 2025-03-13
Payer: COMMERCIAL

## 2025-03-13 DIAGNOSIS — R63.5 WEIGHT GAIN: ICD-10-CM

## 2025-03-13 DIAGNOSIS — G43.909 MIGRAINE WITHOUT STATUS MIGRAINOSUS, NOT INTRACTABLE, UNSPECIFIED MIGRAINE TYPE: ICD-10-CM

## 2025-03-13 DIAGNOSIS — F52.0 HYPOACTIVE SEXUAL DESIRE DISORDER: ICD-10-CM

## 2025-03-13 DIAGNOSIS — Z71.3 WEIGHT LOSS COUNSELING, ENCOUNTER FOR: ICD-10-CM

## 2025-04-15 NOTE — PROGRESS NOTES
Patient ID: Miriam Torres is a 50 y.o. female who presents for No chief complaint on file..    Referring Provider: Mitesh Guerrero  Pt was referred for Addyi     Preferred Pharmacy:    [id number] - [place], [place] - [address]  [address]  [facility] [address]  Phone: [phone] Fax: [phone]    [facility] - [place], [place] - [address]  [address] [facility] [address]  Phone: [phone] Fax: [phone]    [facility] - [place], [place] - [address]  [address]  [address]  [facility] [address]  Phone: [phone] Fax: [phone]    [facility] - [place], [place] - [address]  [address]  [facility] [address]  Phone: [phone] Fax: [phone]    [facility]  [address], [address]  [facility] [address]  Phone: [phone] Fax: [phone]    [facility] [address] [facility] [address]  [facility] [address]  Phone: [phone] Fax: [phone]    [id number] - [place], [place] - [address]  [address]  [facility] [address]  Phone: [phone] Fax: [phone]    [facility] [address]  [address]  [address]  [facility] [address]  Phone: [phone] Fax: [phone]      Subjective    Menopausal age: Hysterectomy at age 48    Ovaries: both remain      Any Contraindications and/or Cautions to HT:   PH/FH breast cancer: No  PH/FH of ovarian cancer: No  PH/FH of uterine cancer: No  PH/FH of colon cancer: No  PH/FH of pancreatic cancer: No  PH/FH Dementia: Maternal grandmother  Stroke, MI, VTE, clotting disorder, or congenital heart disease: No  Systemic lupus erythematosus (increased clot risk): No  H/o Gallbladder disease? No  Risk of cholelithiasis, cholecystitis, and cholecystectomy, less with transdermal because it bypasses first past metabolism.     Osteopenia or osteoporosis:  No    Stress Level (rate 1-10): 9 out of 10   Recommend daily 2-5 minutes of mindfulness, meditation, journaling, etc to help reduce cortisol levels.     Depression? Yes - currently treated with fluoxetine    Anxiety? Yes- currently treated with alprazolam    GI issues? IBS-C since age 13 (endometriosis)  Frequency of BM? Requires laxative to have a BM   -Colonoscopy on Dec 13th   - Next BM 18-19th (bisacodyl x 3)   -Recommend magnesium citrate    -currently taking magnesium 5-6 capsules   Migraine? Yes, current, along with vertigo    Vaginal Symptoms  Dyspareunia (pain before/during/after intercourse): N/A  Vaginal Bleeding: No  Vaginal Dryness: No  Dysuria (pain, burning, stinging, or itching with urination): No  Vaginal and/or vulvar irritation/itching: Yes, lichen sclerosis, using clobetasol 2 nights/week  Recurrent urinary tract infections: No  Recurrent yeast infections: Yes, remote past (early 20's constant)    Lab Results   Component Value Date    FSH 3.3 12/30/2024       Non Pharm Mgmt:   Silicone based - caused irritation after using  Discussed uber lube and good clean love as alternative options  Previous Treatment:   Vaginal estradiol (estrace) - caused irritation  Current Treatment:   Intrarosa - caused irritation after 2nd dose stopped      Vasomotor symptoms   Frequency: None controlled with Divigel    Libido  Wants to increase libido, wants back her sex drive. Fluoxetine has made it difficult to orgasm.   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 Use a tracker for this such as oura, fit bit, whoop, etc.  Strength training? 3 days/week, each day focuses on region  Recommend 2-3x/week for 20 minutes  Consider listening to podcast interviews with Jazz Fajardo  Current dietary pattern (if following a  "specific pattern of eating):    None    Toxins:  Tobacco? No  Alcohol? Yes, 2-4 drinks/week  Reducing to no more than 1 drink/week  THC/CBD? No  Heating food in plastic? Yes  Plastic water bottles? Occasionally    History of Gout? No, rarely to the below  BP Readings from Last 3 Encounters:   02/10/25 124/70   12/31/24 131/86   08/13/24 123/83     No results found for: \"URICACID\"    If yes or high fructose diet (soda, hfcs, agave, fruit juice, etc.) or high animal based protein diet order uric acid level.   Uric acid levels above 5.5 mg/dL are linked to increased risk of metabolic diseases.   Fructose metabolism generates uric acid, signaling to the body to increase storage of fat.   Uric acid decreases nitric oxide, constricting blood vessels and impairing insulin function.   High sodium intake can increase uric acid by signaling dehydration, but staying hydrated dilutes sodiums effect.   Elevated levels of uric acid increase hunger and impulsivity.     Medications potentially contributing to weight gain:   Antidepressants   Medications tried/stopped for weight management:    None       Weight Loss Drug Therapy Options Screening:     Naltrexone and Bupropion  Binge eating: Yes  Depression:  Yes  PMH of Suicidal Ideation: No  Avoid use in patients with:  Uncontrolled hypertension: no  Seizure disorder: no  Eating disorder: no  Use of other bupropion-containing products: no  Chronic opioid use: no  Avoid use if on opioids. Patients treated with naltrexone may respond to lower opioid doses than previously used. This could result in potentially life-threatening opioid intoxication. Warn patients that any attempt to overcome opioid blockade during naltrexone therapy is dangerous and could potentially lead to fatal opioid overdose.  Within 14 days of monoamine oxidase inhibitors: no    GLP-1 and Metformin: No    Pre-Diabetes/Diabetes?   Lab Results   Component Value Date    HGBA1C 5.4 04/27/2023     No results found for: " "\"HSCRP\"    Pertinent PMH Review:   PMH of Pancreatitis: no  PMH of Gallbladder disease: no  PMH of Delayed Gastric Emptying: no  GI issues? Constipation  Frequency of BM? Has to take laxatives to go 1-2x/week  PMH of MTC:  No  PMH of Retinopathy: No    Topiramate:   Migraines? Yes- frequently with vertigo    Adipex:   Anxiety? Yes    Thyroid health:   Lab Results   Component Value Date    TSH 1.91 10/23/2024        MHT  Lab Results   Component Value Date    FSH 3.3 12/30/2024     Any Contraindications and/or Cautions to HT:  Currently taking    Concurrent Chronic Medical Conditions    Cardiovascular Health  The ASCVD Risk score (Salina VELASQUEZ, et al., 2019) failed to calculate for the following reasons:    Cannot find a previous HDL lab    Cannot find a previous total cholesterol lab  Lab Results   Component Value Date    CHOL 196 09/17/2019     Lab Results   Component Value Date    HDL 48.3 09/17/2019     Lab Results   Component Value Date    TRIG 126 09/17/2019        No results found for: \"SBP\", \"DBP\"    Iron/B12/Folate Status  Lab Results   Component Value Date    IRON 119 10/23/2024    TIBC 328 10/23/2024    FERRITIN 113 10/23/2024      Lab Results   Component Value Date    JPNUEUFH22 393 09/17/2019       Liver Function  Lab Results   Component Value Date    AST 14 12/30/2024    ALT 15 12/30/2024        Kidney Function  Lab Results   Component Value Date    GFRF 84 06/01/2023    CREATININE 0.86 06/01/2023       Vitamin D3  Lab Results   Component Value Date    VITD25 33 10/23/2024       Current Outpatient Medications on File Prior to Visit   Medication Sig Dispense Refill    ALPRAZolam (Xanax) 1 mg tablet TAKE ONE TABLET BY MOUTH DAILY AS NEEDED FOR INSOMNIA      amitriptyline (Elavil) 10 mg tablet Take 1 tablet (10 mg) by mouth once daily at bedtime. 30 tablet 2    bisacodyl (Dulcolax) 5 mg EC tablet Take 1 tablet (5 mg) by mouth if needed for constipation.      estradioL 1.25 mg/1.25 gram (0.1 %) gel in packet " APPLY 1 PACKET ON THE SKIN ONCE DAILY 2 g 0    FLUoxetine (PROzac) 40 mg capsule Take 1 capsule (40 mg) by mouth early in the morning..      fluticasone (Flonase) 50 mcg/actuation nasal spray Administer 2 sprays into each nostril if needed for allergies or rhinitis. 16 g 0    loratadine (Claritin) 10 mg tablet Take 1 tablet (10 mg) by mouth if needed.      MELATONIN ORAL Take by mouth as needed at bedtime.      pharmacy compounding accessory misc 2.5 mg once daily. 75 each 11    testosterone 20.25 mg/1.25 gram (1.62 %) gel in metered-dose pump Apply one pump weekly to the back of your leg or inner thigh 75 g 0    topiramate (Topamax) 25 mg tablet Take 1 tablet (25 mg) by mouth 2 times a day. 60 tablet 11     No current facility-administered medications on file prior to visit.        Lifestyle and Nourishment Recommendations (please review following our appointment)  Focus on whole foods as close to nature as possible (less than 5 ingredients on the label)  The order of eating matters during each meal, in order to minimize blood sugar spikes  First, 1/3 to 1/2 of meal as colorful vegetables eaten first  Increase fiber 25-30 grams daily (work up slowly to avoid GI distress)  Goal: Daily bowel movement  Second, eat protein and fat as part of your meal  Increase high quality protein to 25-30 grams per meal along with 2-3 x/week resistance training  May consider 3g/day of Creatine (CreaPure) daily mixed in water to support mood, cognition, muscle mass, and bone mass.    Third, have complex carbohydrates/starches as part of your meal  Consider drinking 1 tbsp of apple cider vinegar in a tall glass of water or as a salad dressing with extra virgin olive oil up to 20 minutes prior to or during a meal.   Helps to reduce blood sugar spikes from complex carbohydrates by up to 30%.   Beverages: Focus on filtered water, organic tea (loose leave or in paper, avoid plastic sachets), or sparkling/mineral water (ex. [place],  [place])   No more than 1 cup (8oz) of organic coffee daily prior to noon. May consider organic green tea.   Avoid alcohol   Avoid ALL artificial sweeteners   Focus on building muscle- muscle is metabolic tissue  Strength training 2-3 times weekly for at least 20 minutes, max out at 8 reps, rest, repeat x 3.   Walk or move for 10 minutes after each meal - ideally outdoors  Goal: 7,000 steps per day, 7 days per week. Use a tracker for this such as oura, fit bit, whoop, etc.   Manage stress: minimum of 2-5 minutes daily for meditation, minfulness, etc. to reduce cortisol levels.  Prioritize 7-9 hours of restful sleep nightly.   Turn off electronics 1 hour prior to bedtime, no electronics in the bedroom.  Establish a regular sleep/wake time (+/- 30 minutes)     Medication and allergy reconciliation completed     Drug Interactions   No significant drug interactions identified    Assessment/Plan     Patients goals:   Would like to loose 20-25 lbs  Discussed patients history, current medical conditions, medications, as well as current diet and lifestyle in depth.   Patient has been making diet/lifestyle changes for:   Current weight: 173  BMI: 31  Desired weight: 150 lbs  Concurrent medical conditions: Chronic constipation (going 1-2x/week with laxatives), hyperlipidemia, migraine.   Weight loss  Based on above screening: recommend first trying topiramate   She is experiencing emotional eating in addition to migraines  Topiramate   Started, but then patient didn't feel great the next day, she stopped, then restarted 2 days ago. She stopped again, didn't feel good, with working couldn't be sick.   She is concerned about hair loss  Started in early 20's, took spironolactone and minoxidil for some time period but did not like spironolactone.   We talked about testosterone levels and hair loss  Ordering DHT to check metabolism of testosterone  Discussed GLP-1's, pricing and dosing  She is going to try topiramate for 1 more  "month then may consider switching  MHT  Divigel  Working well, no side effects  Patient still has ovaries, she is not post menopausal, she had a hysterectomy last year at age 48.   Patient has been experiencing problems with sleep over the past month and has noticed increased depressive symptoms.   Patient has tried progesterone in the past and \"didn't like it\" due to bloating, and didn't see benefits for sleep, was on for over a month.   Testosterone  Fewer headaches since starting, less vertigo.   Desire and orgasms have decreased.   Sleep quality is awful.   Taking melatonin and xanax as needed     MONITORING  No results found for: \"SBP\", \"DBP\"      CONTINUE  Migraine prophylaxis and weight loss  Topiramate 25 mg/day at night for the first week (recommended dose: 100 mg/day, in 2 divided doses)   Divigel 1.25mg/1.25 gram applied nightly    Follow-up: 5/13/25 1pm     Time spent with pt: Total length of time 10 (minutes) of the encounter and more than 50% was spent counseling the patient.      Liset Burns, Pharm.D, [facility], [facility], [facility]  Clinical Pharmacist  Pharmacy Services  [phone]    Continue all meds under the continuation of care with the referring provider and clinical pharmacy team.    Verbal consent to manage patient's drug therapy was obtained from the patient and/or an individual authorized to act on behalf of a patient. They were informed they may decline to participate or withdraw from participation in pharmacy services at any time.  "

## 2025-04-17 ENCOUNTER — APPOINTMENT (OUTPATIENT)
Dept: PHARMACY | Facility: HOSPITAL | Age: 50
End: 2025-04-17
Payer: COMMERCIAL

## 2025-04-17 DIAGNOSIS — G43.909 MIGRAINE WITHOUT STATUS MIGRAINOSUS, NOT INTRACTABLE, UNSPECIFIED MIGRAINE TYPE: ICD-10-CM

## 2025-04-17 DIAGNOSIS — Z71.3 WEIGHT LOSS COUNSELING, ENCOUNTER FOR: ICD-10-CM

## 2025-04-17 DIAGNOSIS — R63.5 WEIGHT GAIN: ICD-10-CM

## 2025-04-17 DIAGNOSIS — F52.0 HYPOACTIVE SEXUAL DESIRE DISORDER: ICD-10-CM

## 2025-04-19 LAB — ANDROSTANOLONE SERPL-MCNC: <5 NG/DL

## 2025-04-21 LAB
TESTOST FREE SERPL-MCNC: 1.1 PG/ML (ref 0.1–6.4)
TESTOST SERPL-MCNC: 11 NG/DL (ref 2–45)

## 2025-05-12 NOTE — PROGRESS NOTES
Patient ID: Miriam Torres is a 50 y.o. female who presents for No chief complaint on file..    Referring Provider: Mitesh Guerrero  Pt was referred for Addyi     Preferred Pharmacy:    [id number] - [place], [place] - [address]  [address]  [facility] [address]  Phone: [phone] Fax: [phone]    [facility] - [place], [place] - [address]  [address] [facility] [address]  Phone: [phone] Fax: [phone]    [facility] - [place], [place] - [address]  [address]  [address]  [facility] [address]  Phone: [phone] Fax: [phone]    [facility] - [place], [place] - [address]  [address]  [facility] [address]  Phone: [phone] Fax: [phone]    [facility]  [address], [address]  [facility] [address]  Phone: [phone] Fax: [phone]    [facility] [address]  [address] [facility] [address]  [facility] [address]  Phone: [phone] Fax: [phone]    [id number] - [place], [place] - [address]  [address]  [facility] [address]  Phone: [phone] Fax: [phone]    [facility] [address]  [address]  [address]  [facility] [address]  Phone: [phone] Fax: [phone]      Subjective    Menopausal age: Hysterectomy at age 48    Ovaries: both remain      Any Contraindications and/or Cautions to HT:   PH/FH breast cancer: No  PH/FH of ovarian cancer: No  PH/FH of uterine cancer: No  PH/FH of colon cancer: No  PH/FH of pancreatic cancer: No  PH/FH Dementia: Maternal grandmother  Stroke, MI, VTE, clotting disorder, or congenital heart disease: No  Systemic lupus erythematosus (increased clot risk): No  H/o Gallbladder disease? No  Risk of cholelithiasis, cholecystitis, and cholecystectomy, less with transdermal because it bypasses first past metabolism.     Osteopenia or osteoporosis:  No    Stress Level (rate 1-10): 9 out of 10   Recommend daily 2-5 minutes of mindfulness, meditation, journaling, etc to help reduce cortisol levels.     Depression? Yes - currently treated with fluoxetine    Anxiety? Yes- currently treated with alprazolam    GI issues? IBS-C since age 13 (endometriosis)  Frequency of BM? Requires laxative to have a BM   -Colonoscopy on Dec 13th   - Next BM 18-19th (bisacodyl x 3)   -Recommend magnesium citrate    -currently taking magnesium 5-6 capsules   Migraine? Yes, current, along with vertigo    Vaginal Symptoms  Dyspareunia (pain before/during/after intercourse): N/A  Vaginal Bleeding: No  Vaginal Dryness: No  Dysuria (pain, burning, stinging, or itching with urination): No  Vaginal and/or vulvar irritation/itching: Yes, lichen sclerosis, using clobetasol 2 nights/week  Recurrent urinary tract infections: No  Recurrent yeast infections: Yes, remote past (early 20's constant)    Lab Results   Component Value Date    FSH 3.3 12/30/2024       Non Pharm Mgmt:   Silicone based - caused irritation after using  Discussed uber lube and good clean love as alternative options  Previous Treatment:   Vaginal estradiol (estrace) - caused irritation  Current Treatment:   Intrarosa - caused irritation after 2nd dose stopped      Vasomotor symptoms   Frequency: None controlled with Divigel    Libido  Wants to increase libido, wants back her sex drive. Fluoxetine has made it difficult to orgasm.   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 Use a tracker for this such as oura, fit bit, whoop, etc.  Strength training? 3 days/week, each day focuses on region  Recommend 2-3x/week for 20 minutes  Consider listening to podcast interviews with Jazz Fajardo  Current dietary pattern (if following a  "specific pattern of eating):    None    Toxins:  Tobacco? No  Alcohol? Yes, 2-4 drinks/week  Reducing to no more than 1 drink/week  THC/CBD? No  Heating food in plastic? Yes  Plastic water bottles? Occasionally    History of Gout? No, rarely to the below  BP Readings from Last 3 Encounters:   02/10/25 124/70   12/31/24 131/86   08/13/24 123/83     No results found for: \"URICACID\"    If yes or high fructose diet (soda, hfcs, agave, fruit juice, etc.) or high animal based protein diet order uric acid level.   Uric acid levels above 5.5 mg/dL are linked to increased risk of metabolic diseases.   Fructose metabolism generates uric acid, signaling to the body to increase storage of fat.   Uric acid decreases nitric oxide, constricting blood vessels and impairing insulin function.   High sodium intake can increase uric acid by signaling dehydration, but staying hydrated dilutes sodiums effect.   Elevated levels of uric acid increase hunger and impulsivity.     Medications potentially contributing to weight gain:   Antidepressants   Medications tried/stopped for weight management:    None       Weight Loss Drug Therapy Options Screening:     Naltrexone and Bupropion  Binge eating: Yes  Depression:  Yes  PMH of Suicidal Ideation: No  Avoid use in patients with:  Uncontrolled hypertension: no  Seizure disorder: no  Eating disorder: no  Use of other bupropion-containing products: no  Chronic opioid use: no  Avoid use if on opioids. Patients treated with naltrexone may respond to lower opioid doses than previously used. This could result in potentially life-threatening opioid intoxication. Warn patients that any attempt to overcome opioid blockade during naltrexone therapy is dangerous and could potentially lead to fatal opioid overdose.  Within 14 days of monoamine oxidase inhibitors: no    GLP-1 and Metformin: No    Pre-Diabetes/Diabetes?   Lab Results   Component Value Date    HGBA1C 5.4 04/27/2023     No results found for: " "\"HSCRP\"    Pertinent PMH Review:   PMH of Pancreatitis: no  PMH of Gallbladder disease: no  PMH of Delayed Gastric Emptying: no  GI issues? Constipation  Frequency of BM? Has to take laxatives to go 1-2x/week  PMH of MTC:  No  PMH of Retinopathy: No    Topiramate:   Migraines? Yes- frequently with vertigo    Adipex:   Anxiety? Yes    Thyroid health:   Lab Results   Component Value Date    TSH 1.91 10/23/2024        MHT  Lab Results   Component Value Date    FSH 3.3 12/30/2024     Any Contraindications and/or Cautions to HT:  Currently taking    Concurrent Chronic Medical Conditions    Cardiovascular Health  The ASCVD Risk score (Salina VELASQUEZ, et al., 2019) failed to calculate for the following reasons:    Cannot find a previous HDL lab    Cannot find a previous total cholesterol lab  Lab Results   Component Value Date    CHOL 196 09/17/2019     Lab Results   Component Value Date    HDL 48.3 09/17/2019     Lab Results   Component Value Date    TRIG 126 09/17/2019        No results found for: \"SBP\", \"DBP\"    Iron/B12/Folate Status  Lab Results   Component Value Date    IRON 119 10/23/2024    TIBC 328 10/23/2024    FERRITIN 113 10/23/2024      Lab Results   Component Value Date    WBWLJIYG02 393 09/17/2019       Liver Function  Lab Results   Component Value Date    AST 14 12/30/2024    ALT 15 12/30/2024        Kidney Function  Lab Results   Component Value Date    GFRF 84 06/01/2023    CREATININE 0.86 06/01/2023       Vitamin D3  Lab Results   Component Value Date    VITD25 33 10/23/2024       Current Outpatient Medications on File Prior to Visit   Medication Sig Dispense Refill    ALPRAZolam (Xanax) 1 mg tablet TAKE ONE TABLET BY MOUTH DAILY AS NEEDED FOR INSOMNIA      amitriptyline (Elavil) 10 mg tablet Take 1 tablet (10 mg) by mouth once daily at bedtime. 30 tablet 2    bisacodyl (Dulcolax) 5 mg EC tablet Take 1 tablet (5 mg) by mouth if needed for constipation.      estradioL 1.25 mg/1.25 gram (0.1 %) gel in packet " APPLY 1 PACKET ON THE SKIN ONCE DAILY 2 g 0    FLUoxetine (PROzac) 40 mg capsule Take 1 capsule (40 mg) by mouth early in the morning..      fluticasone (Flonase) 50 mcg/actuation nasal spray Administer 2 sprays into each nostril if needed for allergies or rhinitis. 16 g 0    loratadine (Claritin) 10 mg tablet Take 1 tablet (10 mg) by mouth if needed.      MELATONIN ORAL Take by mouth as needed at bedtime.      pharmacy compounding accessory misc 2.5 mg once daily. 75 each 11    testosterone 20.25 mg/1.25 gram (1.62 %) gel in metered-dose pump Apply one pump weekly to the back of your leg or inner thigh 75 g 0    topiramate (Topamax) 25 mg tablet Take 1 tablet (25 mg) by mouth 2 times a day. 60 tablet 11     No current facility-administered medications on file prior to visit.        Lifestyle and Nourishment Recommendations (please review following our appointment)  Focus on whole foods as close to nature as possible (less than 5 ingredients on the label)  The order of eating matters during each meal, in order to minimize blood sugar spikes  First, 1/3 to 1/2 of meal as colorful vegetables eaten first  Increase fiber 25-30 grams daily (work up slowly to avoid GI distress)  Goal: Daily bowel movement  Second, eat protein and fat as part of your meal  Increase high quality protein to 25-30 grams per meal along with 2-3 x/week resistance training  May consider 3g/day of Creatine (CreaPure) daily mixed in water to support mood, cognition, muscle mass, and bone mass.    Third, have complex carbohydrates/starches as part of your meal  Consider drinking 1 tbsp of apple cider vinegar in a tall glass of water or as a salad dressing with extra virgin olive oil up to 20 minutes prior to or during a meal.   Helps to reduce blood sugar spikes from complex carbohydrates by up to 30%.   Beverages: Focus on filtered water, organic tea (loose leave or in paper, avoid plastic sachets), or sparkling/mineral water (ex. [place],  "[place]ick)   No more than 1 cup (8oz) of organic coffee daily prior to noon. May consider organic green tea.   Avoid alcohol   Avoid ALL artificial sweeteners   Focus on building muscle- muscle is metabolic tissue  Strength training 2-3 times weekly for at least 20 minutes, max out at 8 reps, rest, repeat x 3.   Walk or move for 10 minutes after each meal - ideally outdoors  Goal: 7,000 steps per day, 7 days per week. Use a tracker for this such as oura, fit bit, whoop, etc.   Manage stress: minimum of 2-5 minutes daily for meditation, minfulness, etc. to reduce cortisol levels.  Prioritize 7-9 hours of restful sleep nightly.   Turn off electronics 1 hour prior to bedtime, no electronics in the bedroom.  Establish a regular sleep/wake time (+/- 30 minutes)     Medication and allergy reconciliation completed     Drug Interactions   No significant drug interactions identified    Assessment/Plan     Patients goals:   Would like to loose 20-25 lbs  Discussed patients history, current medical conditions, medications, as well as current diet and lifestyle in depth.   Patient has been making diet/lifestyle changes for:   Current weight: 173  BMI: 31  Desired weight: 150 lbs  Concurrent medical conditions: Chronic constipation (going 1-2x/week with laxatives), hyperlipidemia, migraine.   Weight loss  Based on above screening: recommend first trying topiramate   Trying to reduce food noise, interested in a GLP-1  Has tried/failed: topiramate  Would prefer to not take Contrave, doesn't want to have to adjust prozac  Discussed GLP-1's, pricing and dosing  She is concerned about hair loss and dizziness  Currently having trouble with pelvic floor, unable to have BM without laxatives.   MHT  Divigel  Tolerating well, no side effects  Patient still has ovaries, she is not post menopausal, she had a hysterectomy last year at age 48.   Patient has tried progesterone in the past and \"didn't like it\" due to bloating, and didn't see " "benefits for sleep, was on for over a month.   Sleep continues to be a problem, describes as \"awful\"   \"Tired yet wired\"   \"Feels very overwhelmed\"   Recommend reducing dose of Divigel to 1mg  Sending to [place]  May consider adding progesterone in the future as a re-trial to help with sleep once estradiol is further reduced.     Testosterone  Using 1 pump once weekly to inner thigh  Recommend switching application to inner arm  Libido has decreased since starting  Feels breast heaviness/fullness  Notices reduction in headaches since starting (even after stopping topiramate)  We reviewed her labs, DHT, Free and Total T WNL  Recommend making an appointment with Mitesh to discuss    MONITORING  No results found for: \"SBP\", \"DBP\"        REDUCE  Divigel 1mg applied nightly    Follow-up: 6/17/25 1:40 pm     Time spent with pt: Total length of time 30 (minutes) of the encounter and more than 50% was spent counseling the patient.      Liset Burns, Pharm.D, FAM, IFP, [facility], [facility]  Clinical Pharmacist  Pharmacy Services  [phone]    Continue all meds under the continuation of care with the referring provider and clinical pharmacy team.    Verbal consent to manage patient's drug therapy was obtained from the patient and/or an individual authorized to act on behalf of a patient. They were informed they may decline to participate or withdraw from participation in pharmacy services at any time.  "

## 2025-05-13 ENCOUNTER — APPOINTMENT (OUTPATIENT)
Dept: PHARMACY | Facility: HOSPITAL | Age: 50
End: 2025-05-13
Payer: COMMERCIAL

## 2025-05-13 DIAGNOSIS — G43.909 MIGRAINE WITHOUT STATUS MIGRAINOSUS, NOT INTRACTABLE, UNSPECIFIED MIGRAINE TYPE: ICD-10-CM

## 2025-05-13 DIAGNOSIS — F52.0 HYPOACTIVE SEXUAL DESIRE DISORDER: ICD-10-CM

## 2025-05-13 DIAGNOSIS — Z71.3 WEIGHT LOSS COUNSELING, ENCOUNTER FOR: ICD-10-CM

## 2025-05-13 DIAGNOSIS — R63.5 WEIGHT GAIN: ICD-10-CM

## 2025-05-13 PROCEDURE — RXMED WILLOW AMBULATORY MEDICATION CHARGE

## 2025-05-13 RX ORDER — ESTRADIOL 1 MG/G
1 GEL TOPICAL DAILY
Qty: 30 G | Refills: 11 | Status: SHIPPED | OUTPATIENT
Start: 2025-05-13

## 2025-05-14 ENCOUNTER — PHARMACY VISIT (OUTPATIENT)
Dept: PHARMACY | Facility: CLINIC | Age: 50
End: 2025-05-14
Payer: COMMERCIAL

## 2025-05-27 DIAGNOSIS — R53.83 OTHER FATIGUE: Primary | ICD-10-CM

## 2025-06-10 ENCOUNTER — TELEPHONE (OUTPATIENT)
Dept: OBSTETRICS AND GYNECOLOGY | Facility: CLINIC | Age: 50
End: 2025-06-10
Payer: COMMERCIAL

## 2025-06-10 PROCEDURE — RXMED WILLOW AMBULATORY MEDICATION CHARGE

## 2025-06-10 NOTE — TELEPHONE ENCOUNTER
verified by name and .  Pt calling regarding testosterone.  Pt states she has been using  testosterone sinPt ce 2025.  Pt states for desire it is not helping.  Questions if there is something to take rather than once a week that is more consistent.  Pt states she would like to stay on it.  Pt states she is taking 1 mg estrogen gel she will continue  taking that for another month.  Pt is aware nurse will send message to Mitesh Guerrero.

## 2025-06-12 ENCOUNTER — PHARMACY VISIT (OUTPATIENT)
Dept: PHARMACY | Facility: CLINIC | Age: 50
End: 2025-06-12
Payer: COMMERCIAL

## 2025-06-16 NOTE — PROGRESS NOTES
Patient ID: Miriam Torres is a 50 y.o. female who presents for No chief complaint on file..    Referring Provider: Mitesh Guerrero  Pt was referred for Addyi     Preferred Pharmacy:    [id number] - [place], [place] - [address]  [address]  [facility] [address]  Phone: [phone] Fax: [phone]    [facility] - [place], [place] - [address]  [address] [facility] [address]  Phone: [phone] Fax: [phone]    [facility] - [place], [place] - [address]  [address]  [address]  [facility] [address]  Phone: [phone] Fax: [phone]    [facility] - [place], [place] - [address]  [address]  [facility] [address]  Phone: [phone] Fax: [phone]    [facility]  [address], [address]  [facility] [address]  Phone: [phone] Fax: [phone]    [facility] [address]  [address] [facility] [address]  [facility] [address]  Phone: [phone] Fax: [phone]    [id number] - [place], [place] - [address]  [address]  [facility] [address]  Phone: [phone] Fax: [phone]    [facility] [address]  [address]  [address]  [facility] [address]  Phone: [phone] Fax: [phone]      Subjective    Menopausal age: Hysterectomy at age 48    Ovaries: both remain      Any Contraindications and/or Cautions to HT:   PH/FH breast cancer: No  PH/FH of ovarian cancer: No  PH/FH of uterine cancer: No  PH/FH of colon cancer: No  PH/FH of pancreatic cancer: No  PH/FH Dementia: Maternal grandmother  Stroke, MI, VTE, clotting disorder, or congenital heart disease: No  Systemic lupus erythematosus (increased clot risk): No  H/o Gallbladder disease? No  Risk of cholelithiasis, cholecystitis, and cholecystectomy, less with transdermal because it bypasses first past metabolism.     Osteopenia or osteoporosis:  No    Stress Level (rate 1-10): 9 out of 10   Recommend daily 2-5 minutes of mindfulness, meditation, journaling, etc to help reduce cortisol levels.     Depression? Yes - currently treated with fluoxetine    Anxiety? Yes- currently treated with alprazolam    GI issues? IBS-C since age 13 (endometriosis)  Frequency of BM? Requires laxative to have a BM   -Colonoscopy on Dec 13th   - Next BM 18-19th (bisacodyl x 3)   -Recommend magnesium citrate    -currently taking magnesium 5-6 capsules   Migraine? Yes, current, along with vertigo    Vaginal Symptoms  Dyspareunia (pain before/during/after intercourse): N/A  Vaginal Bleeding: No  Vaginal Dryness: No  Dysuria (pain, burning, stinging, or itching with urination): No  Vaginal and/or vulvar irritation/itching: Yes, lichen sclerosis, using clobetasol 2 nights/week  Recurrent urinary tract infections: No  Recurrent yeast infections: Yes, remote past (early 20's constant)    Lab Results   Component Value Date    FSH 3.3 12/30/2024       Non Pharm Mgmt:   Silicone based - caused irritation after using  Discussed uber lube and good clean love as alternative options  Previous Treatment:   Vaginal estradiol (estrace) - caused irritation  Current Treatment:   Intrarosa - caused irritation after 2nd dose stopped      Vasomotor symptoms   Frequency: None controlled with Divigel    Libido  Wants to increase libido, wants back her sex drive. Fluoxetine has made it difficult to orgasm.   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 Use a tracker for this such as oura, fit bit, whoop, etc.  Strength training? 3 days/week, each day focuses on region  Recommend 2-3x/week for 20 minutes  Consider listening to podcast interviews with Jazz Fajardo  Current dietary pattern (if following a  "specific pattern of eating):    None    Toxins:  Tobacco? No  Alcohol? Yes, 2-4 drinks/week  Reducing to no more than 1 drink/week  THC/CBD? No  Heating food in plastic? Yes  Plastic water bottles? Occasionally    History of Gout? No, rarely to the below  BP Readings from Last 3 Encounters:   02/10/25 124/70   12/31/24 131/86   08/13/24 123/83     No results found for: \"URICACID\"    If yes or high fructose diet (soda, hfcs, agave, fruit juice, etc.) or high animal based protein diet order uric acid level.   Uric acid levels above 5.5 mg/dL are linked to increased risk of metabolic diseases.   Fructose metabolism generates uric acid, signaling to the body to increase storage of fat.   Uric acid decreases nitric oxide, constricting blood vessels and impairing insulin function.   High sodium intake can increase uric acid by signaling dehydration, but staying hydrated dilutes sodiums effect.   Elevated levels of uric acid increase hunger and impulsivity.     Medications potentially contributing to weight gain:   Antidepressants   Medications tried/stopped for weight management:    None       Weight Loss Drug Therapy Options Screening:     Naltrexone and Bupropion  Binge eating: Yes  Depression:  Yes  PMH of Suicidal Ideation: No  Avoid use in patients with:  Uncontrolled hypertension: no  Seizure disorder: no  Eating disorder: no  Use of other bupropion-containing products: no  Chronic opioid use: no  Avoid use if on opioids. Patients treated with naltrexone may respond to lower opioid doses than previously used. This could result in potentially life-threatening opioid intoxication. Warn patients that any attempt to overcome opioid blockade during naltrexone therapy is dangerous and could potentially lead to fatal opioid overdose.  Within 14 days of monoamine oxidase inhibitors: no    GLP-1 and Metformin: No    Pre-Diabetes/Diabetes?   Lab Results   Component Value Date    HGBA1C 5.4 04/27/2023     No results found for: " "\"HSCRP\"    Pertinent PMH Review:   PMH of Pancreatitis: no  PMH of Gallbladder disease: no  PMH of Delayed Gastric Emptying: no  GI issues? Constipation  Frequency of BM? Has to take laxatives to go 1-2x/week  PMH of MTC:  No  PMH of Retinopathy: No    Topiramate:   Migraines? Yes- frequently with vertigo    Adipex:   Anxiety? Yes    Thyroid health:   Lab Results   Component Value Date    TSH 1.91 10/23/2024        MHT  Lab Results   Component Value Date    FSH 3.3 12/30/2024     Any Contraindications and/or Cautions to HT:  Currently taking    Concurrent Chronic Medical Conditions    Cardiovascular Health  The ASCVD Risk score (Salina VELASQUEZ, et al., 2019) failed to calculate for the following reasons:    Cannot find a previous HDL lab    Cannot find a previous total cholesterol lab  Lab Results   Component Value Date    CHOL 196 09/17/2019     Lab Results   Component Value Date    HDL 48.3 09/17/2019     Lab Results   Component Value Date    TRIG 126 09/17/2019        No results found for: \"SBP\", \"DBP\"    Iron/B12/Folate Status  Lab Results   Component Value Date    IRON 119 10/23/2024    TIBC 328 10/23/2024    FERRITIN 113 10/23/2024      Lab Results   Component Value Date    NUVAFGFU40 393 09/17/2019       Liver Function  Lab Results   Component Value Date    AST 14 12/30/2024    ALT 15 12/30/2024        Kidney Function  Lab Results   Component Value Date    GFRF 84 06/01/2023    CREATININE 0.86 06/01/2023       Vitamin D3  Lab Results   Component Value Date    VITD25 33 10/23/2024       Current Outpatient Medications on File Prior to Visit   Medication Sig Dispense Refill    ALPRAZolam (Xanax) 1 mg tablet TAKE ONE TABLET BY MOUTH DAILY AS NEEDED FOR INSOMNIA      amitriptyline (Elavil) 10 mg tablet Take 1 tablet (10 mg) by mouth once daily at bedtime. 30 tablet 2    bisacodyl (Dulcolax) 5 mg EC tablet Take 1 tablet (5 mg) by mouth if needed for constipation.      estradiol (Divigel) gel Place 1 Application (1 mg) " on the skin once daily. 30 g 11    FLUoxetine (PROzac) 40 mg capsule Take 1 capsule (40 mg) by mouth early in the morning..      fluticasone (Flonase) 50 mcg/actuation nasal spray Administer 2 sprays into each nostril if needed for allergies or rhinitis. 16 g 0    loratadine (Claritin) 10 mg tablet Take 1 tablet (10 mg) by mouth if needed.      MELATONIN ORAL Take by mouth as needed at bedtime.      pharmacy compounding accessory misc 2.5 mg once daily. 75 each 11    testosterone 20.25 mg/1.25 gram (1.62 %) gel in metered-dose pump Apply one pump weekly to the back of your leg or inner thigh 75 g 0     No current facility-administered medications on file prior to visit.        Lifestyle and Nourishment Recommendations (please review following our appointment)  Focus on whole foods as close to nature as possible (less than 5 ingredients on the label)  The order of eating matters during each meal, in order to minimize blood sugar spikes  First, 1/3 to 1/2 of meal as colorful vegetables eaten first  Increase fiber 25-30 grams daily (work up slowly to avoid GI distress)  Goal: Daily bowel movement  Second, eat protein and fat as part of your meal  Increase high quality protein to 25-30 grams per meal along with 2-3 x/week resistance training  May consider 3g/day of Creatine (CreaPure) daily mixed in water to support mood, cognition, muscle mass, and bone mass.    Third, have complex carbohydrates/starches as part of your meal  Consider drinking 1 tbsp of apple cider vinegar in a tall glass of water or as a salad dressing with extra virgin olive oil up to 20 minutes prior to or during a meal.   Helps to reduce blood sugar spikes from complex carbohydrates by up to 30%.   Beverages: Focus on filtered water, organic tea (loose leave or in paper, avoid plastic sachets), or sparkling/mineral water (ex. [place], [place])   No more than 1 cup (8oz) of organic coffee daily prior to noon. May consider organic green tea.    Avoid alcohol   Avoid ALL artificial sweeteners   Focus on building muscle- muscle is metabolic tissue  Strength training 2-3 times weekly for at least 20 minutes, max out at 8 reps, rest, repeat x 3.   Walk or move for 10 minutes after each meal - ideally outdoors  Goal: 7,000 steps per day, 7 days per week. Use a tracker for this such as oura, fit bit, whoop, etc.   Manage stress: minimum of 2-5 minutes daily for meditation, minfulness, etc. to reduce cortisol levels.  Prioritize 7-9 hours of restful sleep nightly.   Turn off electronics 1 hour prior to bedtime, no electronics in the bedroom.  Establish a regular sleep/wake time (+/- 30 minutes)     Medication and allergy reconciliation completed     Drug Interactions   No significant drug interactions identified    Assessment/Plan     Patients goals:   Would like to loose 20-25 lbs  Discussed patients history, current medical conditions, medications, as well as current diet and lifestyle in depth.   Patient has been making diet/lifestyle changes for:   Current weight: 173  BMI: 31  Desired weight: 150 lbs  Concurrent medical conditions: Chronic constipation (going 1-2x/week with laxatives), hyperlipidemia, migraine.   Weight loss  Based on above screening: recommend first trying topiramate  She tried and had side effects (dizziness/fatigue) but would like to try again, restarting.   Trying to reduce food noise, interested in a GLP-1 but cost in prohibitive  Would prefer to not take Contrave, doesn't want to have to adjust prozac  With GLP-1's:   She is concerned about hair loss and dizziness  Currently having trouble with pelvic floor, unable to have BM without laxatives.   Had been taking magnesium oxide, works the best for her (better than citrate). She ran out but plans to restart tomorrow.   MHT  Divigel  Had reduced to 1mg, but then increased back to 1.25mg  At 1mg noticed she experienced greater anxiety and itchy skin  She would like to try 1 month back  "at 1.25mg to see if symptoms improve.   Tolerating well, no side effects  Patient still has ovaries, she is not post menopausal, she had a hysterectomy last year at age 48.   Patient has tried progesterone in the past and \"didn't like it\" due to bloating, and didn't see benefits for sleep, was on for over a month.   Sleep continues to be a problem, has improved some  \"Tired yet wired\"   Progesterone 100mg  Plans to retry, has some on hand  Take nightly at bedtime    Testosterone  Libido has decreased since starting  Recommend making an appointment with Mitesh to discuss        MONITORING  No results found for: \"SBP\", \"DBP\"        CONTINUE  Divigel 1.25 mg applied nightly    RESTART  Progesterone 100mg nightly at bedtime  Topiramate 25mg nightly at bedtime, may increase in 4 weeks to 50mg at bedtime if well tolerated.     Follow-up: 8/12/25 1:40 pm     Time spent with pt: Total length of time 30 (minutes) of the encounter and more than 50% was spent counseling the patient.      Liset Burns, Pharm.D, FAM, IFP, [facility]  Clinical Pharmacist  Pharmacy Services  [phone]    Continue all meds under the continuation of care with the referring provider and clinical pharmacy team.    Verbal consent to manage patient's drug therapy was obtained from the patient and/or an individual authorized to act on behalf of a patient. They were informed they may decline to participate or withdraw from participation in pharmacy services at any time.  "

## 2025-06-17 ENCOUNTER — APPOINTMENT (OUTPATIENT)
Dept: PHARMACY | Facility: HOSPITAL | Age: 50
End: 2025-06-17
Payer: COMMERCIAL

## 2025-06-17 DIAGNOSIS — G43.909 MIGRAINE WITHOUT STATUS MIGRAINOSUS, NOT INTRACTABLE, UNSPECIFIED MIGRAINE TYPE: ICD-10-CM

## 2025-06-17 DIAGNOSIS — R63.5 WEIGHT GAIN: ICD-10-CM

## 2025-06-17 DIAGNOSIS — F52.0 HYPOACTIVE SEXUAL DESIRE DISORDER: ICD-10-CM

## 2025-06-17 DIAGNOSIS — Z71.3 WEIGHT LOSS COUNSELING, ENCOUNTER FOR: ICD-10-CM

## 2025-06-17 PROCEDURE — RXMED WILLOW AMBULATORY MEDICATION CHARGE

## 2025-06-17 RX ORDER — PROGESTERONE 100 MG/1
100 CAPSULE ORAL DAILY
Qty: 90 CAPSULE | Refills: 3 | Status: SHIPPED | OUTPATIENT
Start: 2025-06-17 | End: 2026-06-17

## 2025-06-17 RX ORDER — ESTRADIOL 1.25 MG/1.25G
1.25 GEL TOPICAL DAILY
Qty: 112.5 G | Refills: 3 | Status: SHIPPED | OUTPATIENT
Start: 2025-06-17

## 2025-06-17 RX ORDER — TOPIRAMATE 25 MG/1
TABLET, FILM COATED ORAL
Qty: 60 TABLET | Refills: 11 | Status: SHIPPED | OUTPATIENT
Start: 2025-06-17

## 2025-06-19 ENCOUNTER — PHARMACY VISIT (OUTPATIENT)
Dept: PHARMACY | Facility: CLINIC | Age: 50
End: 2025-06-19
Payer: COMMERCIAL

## 2025-06-24 ENCOUNTER — HOSPITAL ENCOUNTER (OUTPATIENT)
Dept: RADIOLOGY | Facility: CLINIC | Age: 50
Discharge: HOME | End: 2025-06-24
Payer: COMMERCIAL

## 2025-06-24 ENCOUNTER — OFFICE VISIT (OUTPATIENT)
Dept: URGENT CARE | Age: 50
End: 2025-06-24
Payer: COMMERCIAL

## 2025-06-24 VITALS
OXYGEN SATURATION: 95 % | HEART RATE: 69 BPM | RESPIRATION RATE: 16 BRPM | DIASTOLIC BLOOD PRESSURE: 84 MMHG | SYSTOLIC BLOOD PRESSURE: 133 MMHG | TEMPERATURE: 98.1 F

## 2025-06-24 DIAGNOSIS — N80.9 ENDOMETRIOSIS: ICD-10-CM

## 2025-06-24 DIAGNOSIS — N76.0 ACUTE VAGINITIS: Primary | ICD-10-CM

## 2025-06-24 LAB
POC APPEARANCE, URINE: CLEAR
POC BILIRUBIN, URINE: NEGATIVE
POC BLOOD, URINE: NEGATIVE
POC COLOR, URINE: NORMAL
POC GLUCOSE, URINE: NEGATIVE MG/DL
POC KETONES, URINE: NEGATIVE MG/DL
POC LEUKOCYTES, URINE: NEGATIVE
POC NITRITE,URINE: NEGATIVE
POC PH, URINE: 6 PH
POC PROTEIN, URINE: NEGATIVE MG/DL
POC SPECIFIC GRAVITY, URINE: <=1.005
POC UROBILINOGEN, URINE: 0.2 EU/DL
PREGNANCY TEST URINE, POC: NEGATIVE

## 2025-06-24 PROCEDURE — 76856 US EXAM PELVIC COMPLETE: CPT | Performed by: RADIOLOGY

## 2025-06-24 PROCEDURE — 99214 OFFICE O/P EST MOD 30 MIN: CPT

## 2025-06-24 PROCEDURE — 76856 US EXAM PELVIC COMPLETE: CPT

## 2025-06-24 PROCEDURE — 81003 URINALYSIS AUTO W/O SCOPE: CPT

## 2025-06-24 PROCEDURE — 81025 URINE PREGNANCY TEST: CPT

## 2025-06-24 RX ORDER — CLINDAMYCIN PHOSPHATE 20 MG/G
1 CREAM VAGINAL NIGHTLY
Qty: 40 G | Refills: 0 | Status: SHIPPED | OUTPATIENT
Start: 2025-06-24 | End: 2025-07-01

## 2025-06-24 RX ORDER — FLUCONAZOLE 150 MG/1
150 TABLET ORAL SEE ADMIN INSTRUCTIONS
Qty: 2 TABLET | Refills: 0 | Status: SHIPPED | OUTPATIENT
Start: 2025-06-24 | End: 2025-06-25

## 2025-06-24 ASSESSMENT — ENCOUNTER SYMPTOMS
FEVER: 0
SHORTNESS OF BREATH: 0
DYSURIA: 0
HEMATURIA: 0
NAUSEA: 0
CHILLS: 0
DIARRHEA: 0
BACK PAIN: 0
FREQUENCY: 0
ABDOMINAL DISTENTION: 0
VOMITING: 0
FLANK PAIN: 0

## 2025-06-24 NOTE — PATIENT INSTRUCTIONS
At this time suspicion is for possible BV or yeast  Considering allergy to flagyl, the next would be clindamycin as a cream to insert vaginally  Treatment sent for yeast as well, diflucan    Testing for yeast/bv will be sent to lab, additionally will be testing for gonorrhea, chlamydia and trichomonas.   UA reveals no sign of uti, culture not needed for urine at this time,  Majority of these tests will return in 24-48 hours. You can access these results on [employer]. We will call you with any positive results and will send appropriate treatment if needed.     If you develop nausea, vomiting, back pain and fever go to ER    Drink plenty of fluids    Follow up with pcp.

## 2025-06-24 NOTE — PROGRESS NOTES
Subjective   Patient ID: Miriam Torres is a 50 y.o. female. They present today with a chief complaint of Other.    History of Present Illness  Patient presents with one week of vaginal irritation, increase discharge and possible odor. She explains that she has a history of bv in the past. Has a history of allergy to flagyl (rash). Denies n/v/d. Denies fever, chills, sweats. Denies back pain, flank pain. Denies chest pain, sob. Denies vaginal bleeding or hematuria. Denies urinary symptoms at this time including urgency, frequency, burning with urination.           Past Medical History  Allergies as of 06/24/2025 - Reviewed 06/24/2025   Allergen Reaction Noted    Metronidazole Rash 02/19/2024       Prescriptions Prior to Admission[1]     Medical History[2]    Surgical History[3]     reports that she has never smoked. She has never used smokeless tobacco.    Review of Systems  Review of Systems   Constitutional:  Negative for chills and fever.   Respiratory:  Negative for shortness of breath.    Cardiovascular:  Negative for chest pain.   Gastrointestinal:  Negative for abdominal distention, diarrhea, nausea and vomiting.   Genitourinary:  Positive for vaginal discharge. Negative for dysuria, flank pain, frequency, hematuria, urgency, vaginal bleeding and vaginal pain.   Musculoskeletal:  Negative for back pain.                                  Objective    Vitals:    06/24/25 1721   BP: 133/84   Pulse: 69   Resp: 16   Temp: 36.7 °C (98.1 °F)   TempSrc: Oral   SpO2: 95%     Patient's last menstrual period was 04/03/2023.    Physical Exam  Constitutional:       General: She is not in acute distress.     Appearance: She is not toxic-appearing.   HENT:      Head: Normocephalic and atraumatic.   Eyes:      Extraocular Movements: Extraocular movements intact.      Pupils: Pupils are equal, round, and reactive to light.   Pulmonary:      Effort: Pulmonary effort is normal. No respiratory distress.      Breath sounds:  Normal breath sounds. No wheezing.   Abdominal:      General: Abdomen is flat. There is no distension.      Palpations: Abdomen is soft.      Tenderness: There is no abdominal tenderness. There is no right CVA tenderness, left CVA tenderness, guarding or rebound.   Musculoskeletal:      Cervical back: Normal range of motion.   Neurological:      Mental Status: She is alert.         Procedures    Point of Care Test & Imaging Results from this visit  Results for orders placed or performed in visit on 06/24/25   POCT UA Automated manually resulted   Result Value Ref Range    POC Color, Urine Light-Yellow Straw, Yellow, Light-Yellow    POC Appearance, Urine Clear Clear    POC Glucose, Urine NEGATIVE NEGATIVE mg/dl    POC Bilirubin, Urine NEGATIVE NEGATIVE    POC Ketones, Urine NEGATIVE NEGATIVE mg/dl    POC Specific Gravity, Urine <=1.005 1.005 - 1.035    POC Blood, Urine NEGATIVE NEGATIVE    POC PH, Urine 6.0 No Reference Range Established PH    POC Protein, Urine NEGATIVE NEGATIVE mg/dl    POC Urobilinogen, Urine 0.2 0.2, 1.0 EU/DL    Poc Nitrite, Urine NEGATIVE NEGATIVE    POC Leukocytes, Urine NEGATIVE NEGATIVE   POCT pregnancy, urine manually resulted   Result Value Ref Range    Preg Test, Ur Negative Negative      Imaging  No results found.    Cardiology, Vascular, and Other Imaging  No other imaging results found for the past 2 days      Diagnostic study results (if any) were reviewed by Ann Ann PA-C.    Assessment/Plan   Allergies, medications, history, and pertinent labs/EKGs/Imaging reviewed by Ann Ann PA-C.     Medical Decision Making  MDM- Signs and symptoms consistent with vaginitis. History and examination do not support evidence of acute abdomen or pyelo. Will treat with outpatient therapy at this time and cultures pending. Treatment sent for yeast and bv (clindamycin considering flagyl allergy). Advised patient that for any additional positive testing we will call and treat  appropriately at that time. Encourage patient to return to clinic or present to ED if symptoms change or worsen. Otherwise follow up with PCP or Gynecology. Patient verbalized understanding and agrees with plan.       Orders and Diagnoses  Diagnoses and all orders for this visit:  Acute vaginitis  -     POCT UA Automated manually resulted  -     POCT pregnancy, urine manually resulted  -     clindamycin (Cleocin) 2 % vaginal cream; Insert 1 Application into the vagina once daily at bedtime for 7 days.  -     fluconazole (Diflucan) 150 mg tablet; Take 1 tablet (150 mg) by mouth see administration instructions for 1 day. Take one tab now. Repeat in 7 days if symptoms persist.  -     [facility], [facility]      Medical Admin Record      Patient disposition: Home    Electronically signed by Ann Ann PA-C  5:37 PM           [1] (Not in a hospital admission)   [2]   Past Medical History:  Diagnosis Date    Anxiety disorder, unspecified 09/05/2019    Anxiety   [3] No past surgical history on file.

## 2025-06-25 LAB — QUEST FLEXITEST1 RESULTS:: NORMAL

## 2025-08-12 ENCOUNTER — APPOINTMENT (OUTPATIENT)
Dept: PHARMACY | Facility: HOSPITAL | Age: 50
End: 2025-08-12
Payer: COMMERCIAL

## 2025-08-12 DIAGNOSIS — E66.9 OBESITY (BMI 30-39.9): Primary | ICD-10-CM

## 2025-08-12 DIAGNOSIS — F52.0 HYPOACTIVE SEXUAL DESIRE DISORDER: ICD-10-CM

## 2025-08-12 DIAGNOSIS — N95.1 PERIMENOPAUSAL: ICD-10-CM

## 2025-08-13 DIAGNOSIS — Z79.890 MENOPAUSAL SYNDROME ON HORMONE REPLACEMENT THERAPY: Primary | ICD-10-CM

## 2025-08-13 DIAGNOSIS — N95.1 MENOPAUSAL SYNDROME ON HORMONE REPLACEMENT THERAPY: Primary | ICD-10-CM

## 2025-09-23 ENCOUNTER — APPOINTMENT (OUTPATIENT)
Dept: PHARMACY | Facility: HOSPITAL | Age: 50
End: 2025-09-23
Payer: COMMERCIAL

## 2026-02-27 ENCOUNTER — APPOINTMENT (OUTPATIENT)
Dept: OBSTETRICS AND GYNECOLOGY | Facility: CLINIC | Age: 51
End: 2026-02-27
Payer: COMMERCIAL